# Patient Record
Sex: MALE | Race: ASIAN | NOT HISPANIC OR LATINO | URBAN - METROPOLITAN AREA
[De-identification: names, ages, dates, MRNs, and addresses within clinical notes are randomized per-mention and may not be internally consistent; named-entity substitution may affect disease eponyms.]

---

## 2019-09-05 ENCOUNTER — EMERGENCY (EMERGENCY)
Facility: HOSPITAL | Age: 49
LOS: 1 days | Discharge: HOME | End: 2019-09-05
Attending: STUDENT IN AN ORGANIZED HEALTH CARE EDUCATION/TRAINING PROGRAM | Admitting: STUDENT IN AN ORGANIZED HEALTH CARE EDUCATION/TRAINING PROGRAM
Payer: COMMERCIAL

## 2019-09-05 VITALS
TEMPERATURE: 98 F | DIASTOLIC BLOOD PRESSURE: 101 MMHG | RESPIRATION RATE: 16 BRPM | OXYGEN SATURATION: 98 % | SYSTOLIC BLOOD PRESSURE: 143 MMHG | HEART RATE: 71 BPM

## 2019-09-05 VITALS
HEART RATE: 68 BPM | OXYGEN SATURATION: 100 % | DIASTOLIC BLOOD PRESSURE: 78 MMHG | RESPIRATION RATE: 15 BRPM | SYSTOLIC BLOOD PRESSURE: 135 MMHG | TEMPERATURE: 98 F

## 2019-09-05 PROCEDURE — 99284 EMERGENCY DEPT VISIT MOD MDM: CPT | Mod: 25

## 2019-09-05 PROCEDURE — 93010 ELECTROCARDIOGRAM REPORT: CPT

## 2019-09-05 PROCEDURE — 71046 X-RAY EXAM CHEST 2 VIEWS: CPT | Mod: 26

## 2019-09-05 RX ORDER — OXYCODONE HYDROCHLORIDE 5 MG/1
1 TABLET ORAL
Qty: 8 | Refills: 0
Start: 2019-09-05 | End: 2019-09-06

## 2019-09-05 RX ORDER — VALACYCLOVIR 500 MG/1
1 TABLET, FILM COATED ORAL
Qty: 21 | Refills: 0
Start: 2019-09-05 | End: 2019-09-11

## 2019-09-05 RX ORDER — VALACYCLOVIR 500 MG/1
1000 TABLET, FILM COATED ORAL ONCE
Refills: 0 | Status: COMPLETED | OUTPATIENT
Start: 2019-09-05 | End: 2019-09-05

## 2019-09-05 RX ORDER — KETOROLAC TROMETHAMINE 30 MG/ML
15 SYRINGE (ML) INJECTION ONCE
Refills: 0 | Status: DISCONTINUED | OUTPATIENT
Start: 2019-09-05 | End: 2019-09-05

## 2019-09-05 RX ORDER — ACETAMINOPHEN 500 MG
975 TABLET ORAL ONCE
Refills: 0 | Status: COMPLETED | OUTPATIENT
Start: 2019-09-05 | End: 2019-09-05

## 2019-09-05 RX ADMIN — Medication 15 MILLIGRAM(S): at 09:37

## 2019-09-05 RX ADMIN — VALACYCLOVIR 1000 MILLIGRAM(S): 500 TABLET, FILM COATED ORAL at 09:37

## 2019-09-05 RX ADMIN — Medication 975 MILLIGRAM(S): at 09:36

## 2019-09-05 NOTE — ED ADULT TRIAGE NOTE - CHIEF COMPLAINT QUOTE
pt c/o severe pain across upper ribs/abdomen and back. pt also noted w rash across L side of ribs and mid chest which he noticed yesterday. pt did not take pain meds PTA. PMH- HTN, GERD.

## 2019-09-05 NOTE — ED PROVIDER NOTE - ATTENDING CONTRIBUTION TO CARE
ZHANG JamesD: 48M hx HTN p/w 4-5 days of left sided chest/back pain, described as burning/electric, with associated rash. now notes over the last day the pain has changed and is sharp/throbbing, worse with movement, worse with breathing, better with rest. has not taken anything fo rpain. no n/v no diaphoresis, no recent travel, nonsmoker, no calf pain. pt appears uncomfortable on exam at times when pain comes on, with vesicular rash in dermatomal distribution to left chest. abd soft nt nd no calf pain, unabe to reproduce pain on exam with papation of chest. no bruising or ecchymosis. lungs ctab. exam c/w shingles, which would explain burning pain but unclear if this expalins sharp throbbing pain. given no radiation to shoulder do not suspect inflammation of diaphragm, abd bening  do not suspect infradiaphragmatic process. pt is PERC negative so unlikely PE. not c/w acs, but will check ekg to assess for any cardiac pathology. will obtain cxr to ensure no ptx or other obvious lung pathology. symptoms may be pleurisy or just inflammatory from shingles. will give pain control and reassess.

## 2019-09-05 NOTE — ED ADULT NURSE NOTE - NSIMPLEMENTINTERV_GEN_ALL_ED
Implemented All Universal Safety Interventions:  [place] to call system. Call bell, personal items and telephone within reach. Instruct patient to call for assistance. Room bathroom lighting operational. Non-slip footwear when patient is off stretcher. Physically safe environment: no spills, clutter or unnecessary equipment. Stretcher in lowest position, wheels locked, appropriate side rails in place.

## 2019-09-05 NOTE — ED PROVIDER NOTE - OBJECTIVE STATEMENT
49 y/o male hx HTN presents to ED c/o left sided chest/back pain x 4 days. Pt. states 4-5 days ago developed rash to left lateral chest wall - states initially mild pain to area but since thehn pain has become much worse and now spreading to anterior chest wall as well as back. Pt. states today the pain became worse and is now spreading/worsening. denies taking anything for symptoms. states pain with movement and also when taknig a deep breath. Denies fever chills nausea vomit weakness dizziness sob.

## 2019-09-05 NOTE — ED PROVIDER NOTE - PATIENT PORTAL LINK FT
You can access the FollowMyHealth Patient Portal offered by [facility] by registering at the following website: [email or web address]. By joining [employer]’s FollowMyHealth portal, you will also be able to view your health information using other applications (apps) compatible with our system.

## 2019-09-05 NOTE — ED PROVIDER NOTE - SKIN RASH DESCRIPTION
left lateral chest wall: + grouped vesicles with surroudning erythema at t5 area without active bleeding or drainage.

## 2020-12-23 NOTE — ED ADULT NURSE NOTE - NSSUSCREENINGQ3_ED_ALL_ED
Phase II:  1. Patient is identified using name and the date of birth. 2.  The patient is free from signs and symptoms of chemical, electrical, laser, radiation, positioning, or transfer/transport injury. 3.  The patient receives appropriate medication(s), safely administered during the Perioperative period. 4.  The patient has wound/tissue perfusion consistent with or improved from baseline levels established preoperatively. 5.  The patient is at or returning to normothermia at the conclusion of the immediate postoperative period. 6.  The patient's fluid, electrolyte, and acid base balances are consistent with or improved from baseline levels established preoperatively. 7.  The patient's pulmonary function is consistent with or improved from baseline levels established preoperatively. 8.  The patient's cardiovascular status is consistent with or improved from baseline levels established preoperatively. 9.  The patient/caregiver demonstrates knowledge of nutritional management related to the operative or other invasive procedure. 10. The patient/caregiver demonstrates knowledge of medication, pain, and wound management. 11. The patient participates in the rehabilitation process as applicable. 12.  The patient/caregiver participates in decisions affection his or her Perioperative plan of care. 13.  The patient's care is consistent with the individualized Perioperative plan of care. 14.  The patient's right to privacy is maintained. 15. The patient is the recipient of competent and ethical care within legal standards of practice. 16.  The patient's value system, lifestyle, ethnicity, and culture are considered, respected, and incorporated in the Perioperative plan of care and understands special services available. 17.  The patient demonstrates and/or reports adequate pain control throughout the the Perioperative period. 18.   The patient's neurological status is consistent with or improved from No

## 2020-12-30 NOTE — ED ADULT NURSE NOTE - NSHOSCREENINGQ1_ED_ALL_ED
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 Recommended contacting his PCP to have a referral placed. She was concerned patient might have sleep apnea. He needs to complete sleep study within 6 months for work purposes.    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    Ok to place referral to sleep medicine?   No

## 2021-03-20 PROBLEM — I10 ESSENTIAL (PRIMARY) HYPERTENSION: Chronic | Status: ACTIVE | Noted: 2019-09-05

## 2021-04-09 ENCOUNTER — APPOINTMENT (OUTPATIENT)
Dept: DISASTER EMERGENCY | Facility: OTHER | Age: 51
End: 2021-04-09
Payer: COMMERCIAL

## 2021-04-09 PROCEDURE — 0002A: CPT

## 2022-01-25 ENCOUNTER — TRANSCRIPTION ENCOUNTER (OUTPATIENT)
Age: 52
End: 2022-01-25

## 2022-02-13 ENCOUNTER — EMERGENCY (EMERGENCY)
Facility: HOSPITAL | Age: 52
LOS: 1 days | Discharge: HOME | End: 2022-02-13
Attending: EMERGENCY MEDICINE
Payer: COMMERCIAL

## 2022-02-13 VITALS
DIASTOLIC BLOOD PRESSURE: 94 MMHG | TEMPERATURE: 98 F | HEART RATE: 86 BPM | OXYGEN SATURATION: 95 % | RESPIRATION RATE: 18 BRPM | SYSTOLIC BLOOD PRESSURE: 158 MMHG | HEIGHT: 66 IN | WEIGHT: 171.08 LBS

## 2022-02-13 LAB
ALBUMIN SERPL ELPH-MCNC: 4.4 G/DL — SIGNIFICANT CHANGE UP (ref 3.3–5)
ALP SERPL-CCNC: 61 U/L — SIGNIFICANT CHANGE UP (ref 40–120)
ALT FLD-CCNC: 46 U/L — HIGH (ref 10–45)
ANION GAP SERPL CALC-SCNC: 12 MMOL/L — SIGNIFICANT CHANGE UP (ref 5–17)
AST SERPL-CCNC: 23 U/L — SIGNIFICANT CHANGE UP (ref 10–40)
BASOPHILS # BLD AUTO: 0.07 K/UL — SIGNIFICANT CHANGE UP (ref 0–0.2)
BASOPHILS NFR BLD AUTO: 0.9 % — SIGNIFICANT CHANGE UP (ref 0–2)
BILIRUB SERPL-MCNC: 0.3 MG/DL — SIGNIFICANT CHANGE UP (ref 0.2–1.2)
BUN SERPL-MCNC: 10 MG/DL — SIGNIFICANT CHANGE UP (ref 7–23)
CALCIUM SERPL-MCNC: 9.5 MG/DL — SIGNIFICANT CHANGE UP (ref 8.4–10.5)
CHLORIDE SERPL-SCNC: 102 MMOL/L — SIGNIFICANT CHANGE UP (ref 96–108)
CO2 SERPL-SCNC: 23 MMOL/L — SIGNIFICANT CHANGE UP (ref 22–31)
CREAT SERPL-MCNC: 0.79 MG/DL — SIGNIFICANT CHANGE UP (ref 0.5–1.3)
D DIMER BLD IA.RAPID-MCNC: <150 NG/ML DDU — SIGNIFICANT CHANGE UP
EOSINOPHIL # BLD AUTO: 0.18 K/UL — SIGNIFICANT CHANGE UP (ref 0–0.5)
EOSINOPHIL NFR BLD AUTO: 2.3 % — SIGNIFICANT CHANGE UP (ref 0–6)
FLUAV AG NPH QL: SIGNIFICANT CHANGE UP
FLUBV AG NPH QL: SIGNIFICANT CHANGE UP
GLUCOSE SERPL-MCNC: 121 MG/DL — HIGH (ref 70–99)
HCT VFR BLD CALC: 41.5 % — SIGNIFICANT CHANGE UP (ref 39–50)
HGB BLD-MCNC: 14 G/DL — SIGNIFICANT CHANGE UP (ref 13–17)
IMM GRANULOCYTES NFR BLD AUTO: 0.1 % — SIGNIFICANT CHANGE UP (ref 0–1.5)
LYMPHOCYTES # BLD AUTO: 1.88 K/UL — SIGNIFICANT CHANGE UP (ref 1–3.3)
LYMPHOCYTES # BLD AUTO: 24.2 % — SIGNIFICANT CHANGE UP (ref 13–44)
MCHC RBC-ENTMCNC: 30.3 PG — SIGNIFICANT CHANGE UP (ref 27–34)
MCHC RBC-ENTMCNC: 33.7 GM/DL — SIGNIFICANT CHANGE UP (ref 32–36)
MCV RBC AUTO: 89.8 FL — SIGNIFICANT CHANGE UP (ref 80–100)
MONOCYTES # BLD AUTO: 0.65 K/UL — SIGNIFICANT CHANGE UP (ref 0–0.9)
MONOCYTES NFR BLD AUTO: 8.4 % — SIGNIFICANT CHANGE UP (ref 2–14)
NEUTROPHILS # BLD AUTO: 4.97 K/UL — SIGNIFICANT CHANGE UP (ref 1.8–7.4)
NEUTROPHILS NFR BLD AUTO: 64.1 % — SIGNIFICANT CHANGE UP (ref 43–77)
NRBC # BLD: 0 /100 WBCS — SIGNIFICANT CHANGE UP (ref 0–0)
NT-PROBNP SERPL-SCNC: 15 PG/ML — SIGNIFICANT CHANGE UP (ref 0–300)
PLATELET # BLD AUTO: 281 K/UL — SIGNIFICANT CHANGE UP (ref 150–400)
POTASSIUM SERPL-MCNC: 3.9 MMOL/L — SIGNIFICANT CHANGE UP (ref 3.5–5.3)
POTASSIUM SERPL-SCNC: 3.9 MMOL/L — SIGNIFICANT CHANGE UP (ref 3.5–5.3)
PROT SERPL-MCNC: 7.2 G/DL — SIGNIFICANT CHANGE UP (ref 6–8.3)
RBC # BLD: 4.62 M/UL — SIGNIFICANT CHANGE UP (ref 4.2–5.8)
RBC # FLD: 12 % — SIGNIFICANT CHANGE UP (ref 10.3–14.5)
RSV RNA NPH QL NAA+NON-PROBE: SIGNIFICANT CHANGE UP
SARS-COV-2 RNA SPEC QL NAA+PROBE: SIGNIFICANT CHANGE UP
SODIUM SERPL-SCNC: 137 MMOL/L — SIGNIFICANT CHANGE UP (ref 135–145)
TROPONIN T, HIGH SENSITIVITY RESULT: <6 NG/L — SIGNIFICANT CHANGE UP (ref 0–51)
WBC # BLD: 7.76 K/UL — SIGNIFICANT CHANGE UP (ref 3.8–10.5)
WBC # FLD AUTO: 7.76 K/UL — SIGNIFICANT CHANGE UP (ref 3.8–10.5)

## 2022-02-13 PROCEDURE — 99220: CPT

## 2022-02-13 PROCEDURE — 93010 ELECTROCARDIOGRAM REPORT: CPT

## 2022-02-13 PROCEDURE — 71045 X-RAY EXAM CHEST 1 VIEW: CPT | Mod: 26

## 2022-02-13 RX ORDER — ASPIRIN/CALCIUM CARB/MAGNESIUM 324 MG
162 TABLET ORAL ONCE
Refills: 0 | Status: COMPLETED | OUTPATIENT
Start: 2022-02-13 | End: 2022-02-13

## 2022-02-13 RX ORDER — ACETAMINOPHEN 500 MG
650 TABLET ORAL ONCE
Refills: 0 | Status: DISCONTINUED | OUTPATIENT
Start: 2022-02-13 | End: 2022-02-17

## 2022-02-13 RX ADMIN — Medication 162 MILLIGRAM(S): at 18:13

## 2022-02-13 NOTE — ED CDU PROVIDER DISPOSITION NOTE - CARE PROVIDER_API CALL
Jose Juan Power (DO)  Cardiology; Internal Medicine  [address] [place] [address], [address]  [place] [address]  Phone: [phone]  Fax: [phone]  Follow Up Time: 1-3 Days

## 2022-02-13 NOTE — ED ADULT NURSE NOTE - OBJECTIVE STATEMENT
51M aaox4 ambulatory p/w c/o worsening dyspnea. Patient reports chest pain and dyspnea for more than a week now, saw his PMD and was placed on Hydrochlorothiazide and Irbesatan and was instructed to ff-up with cardiologist, Quit smoking 2 years ago, smokes cigarettes for 30 years, recently stopped drinking alcohol 3 weeks ago, h/o HTn, anxiety, and HLD. Patient denies any chills or fever, nausea, vomiting or abd pain. VS WDL.

## 2022-02-13 NOTE — ED CDU PROVIDER DISPOSITION NOTE - ATTENDING CONTRIBUTION TO CARE
Attending MD Edgar:   I personally have seen and examined this patient.  Physician assistant note reviewed and agree on plan of care and except where noted.  See below for details.     Seen in [facility] CDU    51M with PMH/PSH including HTN, GERD, migraines, tinnitus sent to the CDU after presenting to the ED with L sided chest pain with associated LUE pain and shortness of breath.  Reports symptoms resolved.  Denies chest pain, shortness of breath or palpitations.  Denies abdominal pain, nausea, vomiting, diarrhea, bloody or black stools, urinary complaints, fevers, chills.  Denies numbness, weakness or tingling in extremities. Denies visual changes.  A ten (10) point review of systems was negative other than as stated in the HPI or elsewhere in the chart.     Exam:   General: NAD  HENT: head NCAT, airway patent   Eyes: PERRL  Lungs: lungs CTAB with good inspiratory effort, no wheezing, no rhonchi, no rales  Cardiac: +S1S2, no m/r/g  GI: abdomen soft with +BS, NT, ND  : no CVAT  MSK: FROM at neck, no tenderness to midline palpation, no stepoffs along length of spine, no calf tenderness, swelling, erythema or warmth  Neuro: moving all extremities spontaneously, sensory grossly intact, no gross neuro deficits  Psych: normal mood and affect     A/P: 51M with chest pain, now resolved.  CT coronary, labs, and repeat CXR nonactionable, reviewed with patient.  Patient re-evaluated and feeling improved.  No acute issues at  this time.  Lab and radiology tests reviewed with patient.  Patient stable for discharge.  Follow up instructions given, importance of follow up emphasized, return to ED parameters reviewed and patient verbalized understanding.  All questions answered, all concerns addressed.

## 2022-02-13 NOTE — ED CDU PROVIDER INITIAL DAY NOTE - NSICDXPASTMEDICALHX_GEN_ALL_CORE_FT
PAST MEDICAL HISTORY:  COVID-19 vaccine series completed W booster    GERD (gastroesophageal reflux disease)     HTN (hypertension)     Migraines     Tinnitus

## 2022-02-13 NOTE — ED PROVIDER NOTE - ATTENDING CONTRIBUTION TO CARE
Private Physician Amos Chana Aldridge [phone] On Staff  51y male pmh ETOH/dc 3w ago, Ex-smoker dc two years ago. PMH Gerd, Depression, MIgraines HTN, Pt comes to ed c/o SOB, fatigue, out of energy, onset past week, Associated w chest pain, left side, rad to left arm, Seen by pmd two days ago referred for eval. Pain improves w lying worse w exertion. Off/on lasts upto 20 sec. No hx mi, travel, Private Physician Chana Trinidad [phone] On Staff  51y male pmh ETOH/dc 3w ago, Ex-smoker dc two years ago. PMH Gerd, Depression, MIgraines HTN, Pt comes to ed c/o SOB, fatigue, out of energy, onset past week, Associated w chest pain, left side, rad to left arm, Seen by pmd two days ago referred for eval. Pain improves w lying worse w exertion. Off/on lasts upto 20 sec. No hx mi, travel,cancer,leg pain, nvdc, PE WDWN male looking mildly fatigued. heent normocephalic atraumatic neck supple chest clear anterior & posterior cv no rubs, gallops or murmurs abd soft +bs no mass guarding neruo no focal defects. msk no sweelling edema lower extr  Jose Juan Barba MD, Facep

## 2022-02-13 NOTE — ED PROVIDER NOTE - OBJECTIVE STATEMENT
Pt is a 51yoM w/Hx of HTN, GERD, migraines, tinnitus p/w chest pain. For the last week pt has been experiencing intermittent 20-second episodes of CP described as sharp L-sided CP w/radiation down L arm, associated w/SOB, nausea, fatigue/generalized weakness, partially relieved by lying down, worsened by exertion. Seen by PMD 2 days ago who recommended pt come to ED for further evaluation. He denies HA, visual changes, hearing changes, cough/sore throat/congestion, pain with breathing, palpitations, back pain, abd pain, n/v/d/c, dysuria/freq/urg, hematuria/hematochezia/melena, numbness/tingling, focal weakness, swelling, dizziness/lightheadedness, fevers/chills, no sick contacts, no recent travel.

## 2022-02-13 NOTE — ED PROVIDER NOTE - CLINICAL SUMMARY MEDICAL DECISION MAKING FREE TEXT BOX
Pt is a 51yoM w/Hx of HTN, GERD, migraines, tinnitus p/w intermittent episodes of CP associated w/SOB, fatigue, generalized weakness. VSS, phys exam unremarkable. DDx ACS, order labs, CXR, ecg, and reassess. - Ahsley Clarke, PGY-1

## 2022-02-13 NOTE — ED PROVIDER NOTE - NSICDXPASTMEDICALHX_GEN_ALL_CORE_FT
PAST MEDICAL HISTORY:  COVID-19 vaccine series completed W booster    GERD (gastroesophageal reflux disease)     HTN (hypertension)     Migraines      PAST MEDICAL HISTORY:  COVID-19 vaccine series completed W booster    GERD (gastroesophageal reflux disease)     HTN (hypertension)     Migraines     Tinnitus

## 2022-02-13 NOTE — ED CDU PROVIDER INITIAL DAY NOTE - NS ED ROS FT
GENERAL: No fever or chills, EYES: no change in vision, HEENT: no trouble swallowing or speaking, CARDIAC: +chest pain, PULMONARY: +SOB, no cough, GI: no abdominal pain, no nausea, no vomiting, no diarrhea or constipation, : No changes in urination, SKIN: no rashes, NEURO: no headache,  MSK: No joint pain     All other ROS negative unless otherwise specified in HPI.

## 2022-02-13 NOTE — ED CDU PROVIDER INITIAL DAY NOTE - DETAILS
CHEST PAIN  -TELE  -[facility]  -[facility] EVAL  -CT CORONARY   -CASE D/W ATTENDING

## 2022-02-13 NOTE — ED CDU PROVIDER DISPOSITION NOTE - CLINICAL COURSE
Pt is a 51yoM w/Hx of HTN, GERD, migraines, tinnitus p/w chest pain. For the last week pt has been experiencing intermittent 20-second episodes of CP described as sharp L-sided CP w/radiation down L arm, associated w/SOB, nausea, fatigue/generalized weakness, partially relieved by lying down, worsened by exertion. Seen by PMD 2 days ago who recommended pt come to ED for further evaluation. He denies HA, visual changes, hearing changes, cough/sore throat/congestion, pain with breathing, palpitations, back pain, abd pain, n/v/d/c, dysuria/freq/urg, hematuria/hematochezia/melena, numbness/tingling, focal weakness, swelling, dizziness/lightheadedness, fevers/chills, no sick contacts, no recent travel.  In ED, patient had ekg no signs of acute ischemia, troponin <6, chest x ray no signs of acute pathology. Pt sent to CDU for frequent reeval, vitals q 4hrs, telemetry monitoring and CT coronaries. Pt is a 51yoM w/Hx of HTN, GERD, migraines, tinnitus p/w chest pain. For the last week pt has been experiencing intermittent 20-second episodes of CP described as sharp L-sided CP w/radiation down L arm, associated w/SOB, nausea, fatigue/generalized weakness, partially relieved by lying down, worsened by exertion. Seen by PMD 2 days ago who recommended pt come to ED for further evaluation. He denies HA, visual changes, hearing changes, cough/sore throat/congestion, pain with breathing, palpitations, back pain, abd pain, n/v/d/c, dysuria/freq/urg, hematuria/hematochezia/melena, numbness/tingling, focal weakness, swelling, dizziness/lightheadedness, fevers/chills, no sick contacts, no recent travel.  In ED, patient had ekg no signs of acute ischemia, troponin <6, chest x ray no signs of acute pathology. Pt sent to CDU for frequent reeval, vitals q 4hrs, telemetry monitoring and CT coronaries.  In CDU, no events on tele. TTE WNL. CTC nonactionable. Patient seen and cleared by cardiology, to continue home meds and f/u in office. Pt's repeat CXR WNL. Stable for d/c, d/w Dr. Edgar. All results d/w patient including CT findings of mediastinal lymph nodes due to a prior granulomatous infection and 3 mm right lower lobe calcified granuloma. Plan to also give pulm f/u.

## 2022-02-13 NOTE — ED CDU PROVIDER INITIAL DAY NOTE - ATTENDING CONTRIBUTION TO CARE
I have personally performed a face to face diagnostic evaluation on this patient.  I have reviewed the ACP note and agree with the history, exam, and plan of care, except as noted.  History and Exam by me shows  see ED provider note  Jose Juan Barba MD, Facep

## 2022-02-13 NOTE — ED CDU PROVIDER DISPOSITION NOTE - NSFOLLOWUPINSTRUCTIONS_ED_ALL_ED_FT
1. Follow up with your PMD within 48-72 hours.   You may schedule appointment with Cardiology clinic this week by calling [phone]  2. Show copies of your reports given to you. Recommend Aspirin 81mg over the counter daily until further evaluation.  Take all of your other medications as previously prescribed.   3. Worsening or continued chest pain, shortness of breath, weakness, return to ED. Continue your current medication regimen.    Please follow up with Cardiologist, Dr. Power, in office this week.     Please also follow-up with Pulmonologist upon discharge. Information provided.   Be sure to follow-up CT chest findings of: mediastinal lymph nodes due to a prior granulomatous infection and 3 mm right lower lobe calcified granuloma.     Follow up with your Primary Care Provider upon discharge.     Bring a copy of your test results (attached along with your discharge paperwork) with you to your appointment for further discussion, evaluation, and comparison with your prior results.    Please return to the Emergency Department immediately for any new, worsening, or concerning symptoms.    Jose Juan Power (DO)  Cardiology; Internal Medicine  [address] [facility], [address]  [place] [address]  Phone: [phone]    [facility] Pulmonolgy and Sleep Medicine  Pulmonology  [address] [place] [address], [address]  [place] [address]  Phone: [phone]

## 2022-02-13 NOTE — ED CDU PROVIDER INITIAL DAY NOTE - PHYSICAL EXAMINATION
Gen: AAOx3, non-toxic  Head: NCAT  HEENT: EOMI, PERRLA, +oral mucosa dry, normal conjunctiva  Lung: CTAB, no respiratory distress, no wheezes/rhonchi/rales B/L, speaking in full sentences  CV: RRR, no murmurs, rubs or gallops  Abd: soft, NTND, no guarding, no CVA tenderness  MSK: no visible deformities  Neuro: No focal sensory or motor deficits  Skin: Warm, well perfused, no rash

## 2022-02-13 NOTE — ED CDU PROVIDER DISPOSITION NOTE - NSFOLLOWUPCLINICS_GEN_ALL_ED_FT
[facility] Pulmonolgy and Sleep Medicine  Pulmonology  [address] [place] [address], [facility] [address]  [place] [address]  Phone: [phone]  Fax:   Follow Up Time: 1-3 Days

## 2022-02-13 NOTE — ED PROVIDER NOTE - PHYSICAL EXAMINATION
Gen: AAOx3, non-toxic, WDWN male lying in bed, tired-appearing  Head: NCAT  HEENT: EOMI, PERRLA, +oral mucosa dry, normal conjunctiva  Lung: CTAB, no respiratory distress, no wheezes/rhonchi/rales B/L, speaking in full sentences  CV: RRR, no murmurs, rubs or gallops  Abd: soft, NTND, no guarding, no CVA tenderness  MSK: no visible deformities  Neuro: No focal sensory or motor deficits  Skin: Warm, well perfused, no rash  Psych: normal affect.   ~Ashley Clarke M.D. Resident

## 2022-02-13 NOTE — ED CDU PROVIDER INITIAL DAY NOTE - OBJECTIVE STATEMENT
Pt is a 51yoM w/Hx of HTN, GERD, migraines, tinnitus p/w chest pain. For the last week pt has been experiencing intermittent 20-second episodes of CP described as sharp L-sided CP w/radiation down L arm, associated w/SOB, nausea, fatigue/generalized weakness, partially relieved by lying down, worsened by exertion. Seen by PMD 2 days ago who recommended pt come to ED for further evaluation. He denies HA, visual changes, hearing changes, cough/sore throat/congestion, pain with breathing, palpitations, back pain, abd pain, n/v/d/c, dysuria/freq/urg, hematuria/hematochezia/melena, numbness/tingling, focal weakness, swelling, dizziness/lightheadedness, fevers/chills, no sick contacts, no recent travel.  In ED, patient had ekg no signs of acute ischemia, troponin <6, chest x ray no signs of acute pathology. Pt sent to CDU for frequent reeval, vitals q 4hrs, telemetry monitoring and CT coronaries.

## 2022-02-13 NOTE — ED PROVIDER NOTE - NS ED ROS FT
GENERAL: No fever or chills, EYES: no change in vision, HEENT: no trouble swallowing or speaking, CARDIAC: +chest pain, PULMONARY: +SOB, no cough, GI: no abdominal pain, no nausea, no vomiting, no diarrhea or constipation, : No changes in urination, SKIN: no rashes, NEURO: no headache,  MSK: No joint pain     All other ROS negative unless otherwise specified in HPI.     ~Ashley Clarke M.D. Resident

## 2022-02-13 NOTE — ED CDU PROVIDER DISPOSITION NOTE - PATIENT PORTAL LINK FT
You can access the FollowMyHealth Patient Portal offered by [facility] by registering at the following website: [email or web address]. By joining [employer]’s FollowMyHealth portal, you will also be able to view your health information using other applications (apps) compatible with our system.

## 2022-02-14 VITALS
SYSTOLIC BLOOD PRESSURE: 122 MMHG | HEART RATE: 58 BPM | DIASTOLIC BLOOD PRESSURE: 83 MMHG | RESPIRATION RATE: 18 BRPM | OXYGEN SATURATION: 100 % | TEMPERATURE: 98 F

## 2022-02-14 DIAGNOSIS — K21.9 GASTRO-ESOPHAGEAL REFLUX DISEASE WITHOUT ESOPHAGITIS: ICD-10-CM

## 2022-02-14 DIAGNOSIS — I10 ESSENTIAL (PRIMARY) HYPERTENSION: ICD-10-CM

## 2022-02-14 DIAGNOSIS — R07.9 CHEST PAIN, UNSPECIFIED: ICD-10-CM

## 2022-02-14 LAB
A1C WITH ESTIMATED AVERAGE GLUCOSE RESULT: 5.5 % — SIGNIFICANT CHANGE UP (ref 4–5.6)
CHOLEST SERPL-MCNC: 180 MG/DL — SIGNIFICANT CHANGE UP
ESTIMATED AVERAGE GLUCOSE: 111 MG/DL — SIGNIFICANT CHANGE UP (ref 68–114)
HDLC SERPL-MCNC: 55 MG/DL — SIGNIFICANT CHANGE UP
LIPID PNL WITH DIRECT LDL SERPL: 111 MG/DL — HIGH
NON HDL CHOLESTEROL: 125 MG/DL — SIGNIFICANT CHANGE UP
TRIGL SERPL-MCNC: 70 MG/DL — SIGNIFICANT CHANGE UP
TROPONIN T, HIGH SENSITIVITY RESULT: <6 NG/L — SIGNIFICANT CHANGE UP (ref 0–51)

## 2022-02-14 PROCEDURE — 93005 ELECTROCARDIOGRAM TRACING: CPT | Mod: XU

## 2022-02-14 PROCEDURE — 99217: CPT

## 2022-02-14 PROCEDURE — 87637 SARSCOV2&INF A&B&RSV AMP PRB: CPT

## 2022-02-14 PROCEDURE — 85379 FIBRIN DEGRADATION QUANT: CPT

## 2022-02-14 PROCEDURE — 99285 EMERGENCY DEPT VISIT HI MDM: CPT | Mod: 25

## 2022-02-14 PROCEDURE — 71046 X-RAY EXAM CHEST 2 VIEWS: CPT

## 2022-02-14 PROCEDURE — 83880 ASSAY OF NATRIURETIC PEPTIDE: CPT

## 2022-02-14 PROCEDURE — 71046 X-RAY EXAM CHEST 2 VIEWS: CPT | Mod: 26

## 2022-02-14 PROCEDURE — 93306 TTE W/DOPPLER COMPLETE: CPT

## 2022-02-14 PROCEDURE — 80053 COMPREHEN METABOLIC PANEL: CPT

## 2022-02-14 PROCEDURE — 75574 CT ANGIO HRT W/3D IMAGE: CPT | Mod: 26,MA

## 2022-02-14 PROCEDURE — 93306 TTE W/DOPPLER COMPLETE: CPT | Mod: 26

## 2022-02-14 PROCEDURE — 80061 LIPID PANEL: CPT

## 2022-02-14 PROCEDURE — 75574 CT ANGIO HRT W/3D IMAGE: CPT | Mod: MA

## 2022-02-14 PROCEDURE — 84484 ASSAY OF TROPONIN QUANT: CPT

## 2022-02-14 PROCEDURE — 85025 COMPLETE CBC W/AUTO DIFF WBC: CPT

## 2022-02-14 PROCEDURE — 36415 COLL VENOUS BLD VENIPUNCTURE: CPT

## 2022-02-14 PROCEDURE — 71045 X-RAY EXAM CHEST 1 VIEW: CPT

## 2022-02-14 PROCEDURE — 83036 HEMOGLOBIN GLYCOSYLATED A1C: CPT

## 2022-02-14 PROCEDURE — G0378: CPT

## 2022-02-14 NOTE — ED CDU PROVIDER SUBSEQUENT DAY NOTE - PROGRESS NOTE DETAILS
Patient seen and evaluated at bedside with Dr. Cunningham. Reports intermittent L-sided CP/tightness, no current pain. Reports episodes of SOB yesterday and the day prior that had never happened before. Denies cough, fever/chills, LE pain/swelling, abdominal pain. VSS. Pending CTC today. Plan to also add Echo. Echo lab called to expedite. No events on tele. Called by radiology regarding concern for air under R hemidiaphragm on patient's previous CXR. Pt remains with NO abdominal pain. Tolerating PO and feeling well. D/w Dr. Edgar, will repeat upright CXR. CTC results reviewed. Unattached cardiology, Dr. Power, consulted. Reports NP will come see patient now. Patient seen and cleared by cardiology, to continue home meds and f/u in office. Pt's repeat CXR WNL. Stable for d/c, d/w Dr. Edgar. Called by radiology regarding concern for air under R hemidiaphragm on patient's previous CXR. Pt remains with NO abdominal pain. No current SOB. Tolerating PO and feeling well. D/w Dr. Edgar, will repeat upright CXR. Patient seen and cleared by cardiology, to continue home meds and f/u in office. Pt's repeat CXR WNL. Stable for d/c, d/w Dr. Edgar. All results d/w patient including CT findings of mediastinal lymph nodes due to a prior granulomatous infection and 3 mm right lower lobe calcified granuloma. Plan to also give pulm f/u.

## 2022-02-14 NOTE — ED CDU PROVIDER SUBSEQUENT DAY NOTE - ATTENDING CONTRIBUTION TO CARE
Agree with above except noted:     acute onset of shortness of breath and dynpnea, w.o fever or cough. mild chest pain w. neg trop and dimer and bnp at initial eval. vital signs wnl, nontoxic appearing, NAD, pending ctc at CDU and will add on echo for HF eval. no LE edema or swelling. no sign of infectious etiology at this time. dispo pending labs/imaging/further workups.

## 2022-02-14 NOTE — ED ADULT NURSE REASSESSMENT NOTE - NS ED NURSE REASSESS COMMENT FT1
07.00 Am Received the Pt from  NORA Mistry Pt is Observed for Chest pain for CTC. Received the Pt A&OX 4 obeys commands Hortencia N/V/D fever chills cp SOB   Comfort care & safety measures continued  IV site looks clean & dry no signs of infiltration noted pt denies  pain IV site .  Pt is advised to call for help  call bell with in the reach pt verbalized the understanding . pending CDU  MD bowen . GCS 15/15 A&OX 4 PERRLA  size 3 Strong upper & lower extremities steady gait   No facial droop  No Hand Leg drop denies numbness tingling Continue to monitor. Pt is NSR on the monitor
15.30 Pt is evaluated by CDU MD Tala Campos . pt is feeling better.  Pt is discharged . Ml out  TERRELL Lopez explained the follow up care & gave the discharge summary  . Pt has stable vitals steady gait A&OX 4 at the time of Discharge
Report received from NORA Carbajal. Pt AAOx4, NAD, resp nonlabored, skin warm/dry, resting comfortably in bed with call bell at bedside. Pt denies headache, dizziness, chest pain, palpitations, SOB, abd pain, n/v/d, urinary symptoms, fevers, chills, weakness at this time. Pt awaiting CDU PA assessment. Safety maintained.
Pt received from NORA Johnson. Pt oriented to CDU & plan of care was discussed. Pt A&O x 4. Pt in CDU for tele, CTC in am. Pt c/o 3/10 chest tightness, declines anything at this time. Pt denies any SOB, dizziness or palpitations. Pt on a cardiac monitor in sinus elpidio, HR in 50's. V/S stable, pt afebrile,  IV in place, patent and free of signs of infiltration. Pt resting in bed. Safety & comfort measures maintained. Call bell in reach. Will continue to monitor.

## 2022-02-14 NOTE — ED CDU PROVIDER SUBSEQUENT DAY NOTE - HISTORY
CDU PROGRESS NOTE TERRELL SALCIDO: Pt resting comfortably, NAD, VSS. No events on telemetry. Will continue to monitor, CTC in am.

## 2022-02-14 NOTE — CONSULT NOTE ADULT - PROBLEM SELECTOR RECOMMENDATION 9
intermittent chest tightening with radiation to left arm  reviewed CT Angio & TTE - normal  EKG normal - no acute ischemia  NSR on tele  troponins negative  outpatient follow up with Dr. Power - discussed with patient

## 2022-02-14 NOTE — ED CDU PROVIDER SUBSEQUENT DAY NOTE - PHYSICAL EXAMINATION
Gen: AAOx3, non-toxic  Head: NCAT  HEENT: EOMI, PERRLA, normal conjunctiva  Lung: CTAB, no respiratory distress, no wheezes/rhonchi/rales B/L, speaking in full sentences  CV: RRR, no murmurs, rubs or gallops  Abd: soft, NTND, no guarding, no CVA tenderness  MSK: no visible deformities  Neuro: No focal sensory or motor deficits  Skin: Warm, well perfused, no rash

## 2022-02-14 NOTE — CONSULT NOTE ADULT - SUBJECTIVE AND OBJECTIVE BOX
CHIEF COMPLAINT:  Chest pain    HISTORY OF PRESENT ILLNESS:  50yo M w/ Hx of HTN, GERD, migraines, tinnitus p/w chest pain. For the last week pt has been experiencing intermittent 20-second episodes of CP described as sharp L-sided CP w/radiation down L arm, associated w/SOB, nausea, fatigue/generalized weakness, partially relieved by lying down, worsened by exertion. Seen by PMD 2 days ago who recommended pt come to ED for further evaluation. He denies HA, visual changes, hearing changes, cough/sore throat/congestion, pain with breathing, palpitations, back pain, abd pain, n/v/d/c, dysuria/freq/urg, hematuria/hematochezia/melena, numbness/tingling, focal weakness, swelling, dizziness/lightheadedness, fevers/chills, no sick contacts, no recent travel.    In ED, patient had EKG with no signs of acute ischemia, troponin <6, chest x ray no signs of acute pathology.  Pt sent to CDU for frequent re-eval, vitals q 4hrs, telemetry monitoring and CT coronaries.    PAST MEDICAL & SURGICAL HISTORY:  HTN (hypertension)    GERD (gastroesophageal reflux disease)    Migraines    COVID-19 vaccine series completed  W booster    Tinnitus    No significant past surgical history    MEDICATIONS:    acetaminophen     Tablet .. 650 milliGRAM(s) Oral once    FAMILY HISTORY:  FH: heart disease (Grandparent)    SOCIAL HISTORY:    [ ] Non-smoker  [ ] Smoker  [ ] Alcohol    Allergies    No Known Allergies    Intolerances    REVIEW OF SYSTEMS:  CONSTITUTIONAL: No fever, weight loss, or fatigue  EYES: No eye pain, visual disturbances, or discharge  ENMT:  No difficulty hearing, tinnitus, vertigo; No sinus or throat pain  NECK: No pain or stiffness  RESPIRATORY: No cough, wheezing, chills or hemoptysis; + Shortness of Breath  CARDIOVASCULAR: + chest pain, palpitations, passing out, dizziness, or leg swelling  GASTROINTESTINAL: No abdominal or epigastric pain. No nausea, vomiting, or hematemesis; No diarrhea or constipation. No melena or hematochezia.  GENITOURINARY: No dysuria, frequency, hematuria, or incontinence  NEUROLOGICAL: No headaches, memory loss, loss of strength, numbness, or tremors  SKIN: No itching, burning, rashes, or lesions   LYMPH Nodes: No enlarged glands  ENDOCRINE: No heat or cold intolerance; No hair loss  MUSCULOSKELETAL: No joint pain or swelling; No muscle, back, or extremity pain  PSYCHIATRIC: No depression, anxiety, mood swings, or difficulty sleeping  HEME/LYMPH: No easy bruising, or bleeding gums  ALLERY AND IMMUNOLOGIC: No hives or eczema	    [ ] All others negative	  [ ] Unable to obtain    PHYSICAL EXAM:  T(C): 37.1 (02-14-22 @ 07:43), Max: 37.1 (02-14-22 @ 07:43)  HR: 87 (02-14-22 @ 07:43) (59 - 87)  BP: 135/95 (02-14-22 @ 07:43) (132/- - 158/94)  RR: 18 (02-14-22 @ 07:43) (18 - 21)  SpO2: 100% (02-14-22 @ 07:43) (95% - 100%)  Wt(kg): --  I&O's Summary    Appearance: NAD	  HEENT: Normal oral mucosa, PERRL, EOMI	  Lymphatic: No lymphadenopathy  Cardiovascular: Normal S1 S2, No JVD, No murmurs, No edema  Respiratory: Lungs clear to auscultation	  Psychiatry: A & O x 3, Mood & affect appropriate  Gastrointestinal:  Soft, Non-tender, + BS	  Skin: No rashes, No ecchymoses, No cyanosis	  Neurologic: Non-focal  Extremities: Normal range of motion, No clubbing, cyanosis or edema  Vascular: Peripheral pulses palpable 2+ bilaterally    TELEMETRY: SR	    ECG:  SR with no acute ischemia	  RADIOLOGY:  < from: CT Angio Heart and Coronaries w/ IV Cont (02.14.22 @ 10:58) >  ACC: [id number] EXAM:  CT ANGIO HEART CORONARY IC                          PROCEDURE DATE:  02/14/2022      INTERPRETATION:  Indication:  Coronary artery disease    History:  Mr. Araujo is a 51-year-old man with hypertension, dyslipidemia,   and a history of tobacco use who reports chest pain and dyspnea.    Scanner:  [facility] [employer]    Acquisitions:  1.  Non-contrast prospectively-gated 320-multidtector volumetric computed   tomography heart  2.  Contrast-enhanced prospectively-gated 320-multidetector volumetric   computed tomography heart    Pre-medications:  1.  Metoprolol 15 mg intravenous  2.  Nitroglycerin 0.8 mg sublingual    Contrast:  75 mL Omnipaque 350    Resting heart rate (contrast-enhanced acquisition):  51 beats per minute    Quality:  Good    Post-processing:  Three-dimensional volume-rendered and multiplanar   reconstructions generated with [employer] software.    FINDINGS:    Cardiovascular:    Coronary arteries:  Coronary artery calcium Agatston score:  Left main (LM) coronary artery:  0  Left anterior descending (LAD) coronary artery:  0  Left circumflex (LCX) coronary artery:  0  Right coronary artery (RCA):  0 (A very small focus of calcium did not   meet the Agatston score threshold for attenuation and/or size.)  Total:  0    Right-dominant coronary circulation.    The LM coronary artery is angiographically normal.    The LAD coronary artery has minimal (<30%) noncalcified plaque in the   proximal segment. A superficial myocardial bridge is noted in the mid   segment. The LAD wraps around the left ventricular apex. The two diagonal   branches are angiographically normal.    The LCX coronary artery is angiographically normal. A small, high first   obtuse marginal (OM) branch is present. Thesecond OM branch is   angiographically normal. The LCX terminates as a left posterolateral   (LPL) branch.    The RCA has minimal (<30%) noncalcified and calcified plaque in the   proximal segment.  The right posterior descending artery (PDA) is   angiographically normal.  A small right posterolateral (RPL) branch is   present.    Aorta:  No thoracic aortic dissection noted in the visualized thoracic aorta.    Minimal noncalcified plaque is present in the visualized thoracic aorta.    Pericardium:  There is no pericardial effusion.    Chambers:  The cardiac chambers are qualitatively normal in size.    No filling defect noted in the left atrial appendage.    There is a patent foramen ovale with evidence of left-to-right flow of   contrast agent.    Non-cardiac:  Calcified mediastinal and right hilar lymph nodes due to a prior   granulomatous infection. Evaluation of the imaged portions of the lungs   demonstrate 3 mm right lower lobe calcified granuloma. Degenerative   changes of the spine.    IMPRESSION:  1.  Nonobstructive epicardial coronary artery disease as reported above.  2.  Coronary artery calcium Agatston score = 0. A very small focus of   calcium in the RCA did not meet the Agatston score threshold for   attenuation/size.    --- End of Report ---    < end of copied text >  < from: Xray Chest 1 View AP/PA (02.13.22 @ 18:15) >  ACC: [id number] EXAM:  XR CHEST AP OR PA 1V                          PROCEDURE DATE:  02/13/2022      INTERPRETATION:  EXAMINATION: XR CHEST    CLINICAL INDICATION: Chest pain.    TECHNIQUE: Single frontal, portable view of the chest was obtained.    COMPARISON: Chest radiograph 9/5/2019.    FINDINGS:    The heart is normal in size.  The lungs are clear.  There is no pneumothorax or pleural effusion.  No acute bony abnormalities.    IMPRESSION:  Clear lungs.    --- End of Report ---    < end of copied text >  < from: Xray Chest 2 Views PA/Lat (09.05.19 @ 10:37) >    EXAM:  XR CHEST PA LAT 2V        PROCEDURE DATE:  Sep  5 2019         INTERPRETATION:  CLINICAL INFORMATION: Chest pain    TIME OF EXAMINATION: September 5, 2019 at 10:54 AM    EXAM: PA and Lateral Chest    FINDINGS:  Examination in frontal and lateral projection fails to show   evidence of any active pulmonary disease.  The heart is not enlarged and   there is no pleural effusion or pneumothorax.  There is no acute bone   pathology.        COMPARISON: September 19, 2007        IMPRESSION: Normal chest    < end of copied text >    OTHER: 	  	  LABS:	 	    CARDIAC MARKERS:  Troponin T, High Sensitivity Result: <6                        14.0   7.76  )-----------( 281      ( 13 Feb 2022 18:00 )             41.5     02-13    137  |  102  |  10  ----------------------------<  121<H>  3.9   |  23  |  0.79    Ca    9.5      13 Feb 2022 18:00    TPro  7.2  /  Alb  4.4  /  TBili  0.3  /  DBili  x   /  AST  23  /  ALT  46<H>  /  AlkPhos  61  02-13    proBNP: Serum Pro-Brain Natriuretic Peptide: 15 pg/mL (02-13 @ 18:00)  Lipid Profile: Cholesterol, Serum: 180 mg/dL   Triglycerides, Serum: 70 mg/dL   HDL Cholesterol, Serum: 55 mg/dL   Non HDL Cholesterol: 125    LDL Cholesterol Calculated: 111 mg/dL   HgA1c: A1C with Estimated Average Glucose Result: 5.5  TSH:

## 2022-02-16 PROBLEM — K21.9 GASTRO-ESOPHAGEAL REFLUX DISEASE WITHOUT ESOPHAGITIS: Chronic | Status: ACTIVE | Noted: 2022-02-13

## 2022-02-16 PROBLEM — G43.909 MIGRAINE, UNSPECIFIED, NOT INTRACTABLE, WITHOUT STATUS MIGRAINOSUS: Chronic | Status: ACTIVE | Noted: 2022-02-13

## 2022-02-16 PROBLEM — Z92.29 PERSONAL HISTORY OF OTHER DRUG THERAPY: Chronic | Status: ACTIVE | Noted: 2022-02-13

## 2022-02-16 PROBLEM — H93.19 TINNITUS, UNSPECIFIED EAR: Chronic | Status: ACTIVE | Noted: 2022-02-13

## 2022-02-25 ENCOUNTER — APPOINTMENT (OUTPATIENT)
Dept: PULMONOLOGY | Facility: CLINIC | Age: 52
End: 2022-02-25
Payer: COMMERCIAL

## 2022-02-25 VITALS
DIASTOLIC BLOOD PRESSURE: 80 MMHG | SYSTOLIC BLOOD PRESSURE: 118 MMHG | BODY MASS INDEX: 25.71 KG/M2 | OXYGEN SATURATION: 97 % | RESPIRATION RATE: 17 BRPM | HEART RATE: 82 BPM | TEMPERATURE: 97.5 F | HEIGHT: 66 IN | WEIGHT: 160 LBS

## 2022-02-25 DIAGNOSIS — Z87.891 PERSONAL HISTORY OF NICOTINE DEPENDENCE: ICD-10-CM

## 2022-02-25 PROCEDURE — 99203 OFFICE O/P NEW LOW 30 MIN: CPT

## 2022-02-25 NOTE — PHYSICAL EXAM
[No Acute Distress] : no acute distress [Normal Appearance] : normal appearance [No Resp Distress] : no resp distress [Clear to Auscultation Bilaterally] : clear to auscultation bilaterally [Normal Gait] : normal gait [No Edema] : no edema [No Focal Deficits] : no focal deficits [Oriented x3] : oriented x3

## 2022-02-25 NOTE — HISTORY OF PRESENT ILLNESS
[TextBox_4] : Pt here for follow up SOB following chest pain. Denies cough/ wheeze. Some need to clear throat frequently. Since is onset c/o tinnitus x 1 month causing increased depression/anxiety/dizziness, 13 lb weight loss.  Since ED visit- he is followed by PCP and treatment on antidepressants/ complimentary alternative medication/ acupuncture, anti-HTN medication\par \par CXR clear in ED\par Echo 2/14/2020 - normal\par \par CT heart/coronaries\par IMPRESSION:\par 1.  Nonobstructive epicardial coronary artery disease as reported above.\par 2.  Coronary artery calcium Agatston score = 0. 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

## 2022-02-25 NOTE — ASSESSMENT
[FreeTextEntry1] : SOB: Check PFT. Chest Xray in ED normal. Some component of anxiety. Ongoing f/u and adherence to anxiolytic therapy/ CAM/ acupunctures. Referral for CBT program/ eval. \par \par Lung ca. screening:Hx heeavy smoking: lung ca. screening completed. Hx of 1ppd smoking from 14 years old to 49 years (35 pack years)\par \par I, Giuliana Hargrove NP, am scribing for and in the presence of Dr. Lencho Lockwood, the following sections HISTORY OF PRESENT ILLNESS, PAST MEDICAL/FAMILY/SOCIAL HISTORY; REVIEW OF SYSTEMS; VITAL SIGNS; PHYSICAL EXAM; DISPOSITION.\par

## 2022-09-21 ENCOUNTER — APPOINTMENT (OUTPATIENT)
Dept: ORTHOPEDIC SURGERY | Facility: CLINIC | Age: 52
End: 2022-09-21

## 2022-09-21 VITALS
BODY MASS INDEX: 27.32 KG/M2 | SYSTOLIC BLOOD PRESSURE: 138 MMHG | WEIGHT: 170 LBS | DIASTOLIC BLOOD PRESSURE: 101 MMHG | HEIGHT: 66 IN | HEART RATE: 80 BPM

## 2022-09-21 PROCEDURE — 99203 OFFICE O/P NEW LOW 30 MIN: CPT

## 2022-09-21 NOTE — HISTORY OF PRESENT ILLNESS
[de-identified] : Mr. LEIGHA CARSON  is a 52 year old male who presents with a chronic history of low back and left > right pain.  He has also developed left abdominal pain since last November.  His back and leg pain is a big problem.  He can not stand and brush his teeth.  He has done physical therapy, acupuncture, and chiropractic care  with minimal relief.  Normal bowel and bladder control.   Denies any recent fevers, chills, sweats, weight loss, or infection.\par \par The patients past medical history, past surgical history, medications, allergies, and social history were reviewed by me today with the patient and documented accordingly.  In addition, the patient's family history, which is noncontributory to their visit, was also reviewed.\par

## 2022-09-21 NOTE — DISCUSSION/SUMMARY
[de-identified] : He suffers from lumbar radiculopathy refractory to conservative management with physical therapy.  I recommended a lumbar spine MRI.  Follow-up afterwards

## 2022-09-21 NOTE — PHYSICAL EXAM
[Antalgic] : antalgic [de-identified] : Examination of the lumbar spine reveals no midline tenderness palpation, step-offs, or skin lesions. Decreased range of motion with respect to flexion, extension, lateral bending, and rotation. No tenderness to palpation of the sciatic notch. No tenderness palpation of the bilateral greater trochanters. No pain with passive internal/external rotation of the hips. No instability of bilateral lower extremities.  Negative JES. Negative straight leg raise bilaterally. No bowstring. Negative femoral stretch.  4+ out of 5 iliopsoas, hip abductors, hips adductors, quadriceps, hamstrings, gastrocsoleus, tibialis anterior, extensor hallucis longus, peroneals. Grossly intact sensation to light touch bilateral lower extremities. 1+ patellar and Achilles reflexes. Downgoing Babinski. No clonus. Intact proprioception. Palpable pulses. No skin lesion and no edema on the right and left lower extremities. [de-identified] : Review of his recent AP lateral lumbar x-rays by me reveals some spondylosis without gross instability or aggressive lesions

## 2022-10-27 ENCOUNTER — APPOINTMENT (OUTPATIENT)
Dept: UROLOGY | Facility: CLINIC | Age: 52
End: 2022-10-27

## 2022-10-27 DIAGNOSIS — R06.02 SHORTNESS OF BREATH: ICD-10-CM

## 2022-10-27 DIAGNOSIS — K57.90 DIVERTICULOSIS OF INTESTINE, PART UNSPECIFIED, W/OUT PERFORATION OR ABSCESS W/OUT BLEEDING: ICD-10-CM

## 2022-10-27 DIAGNOSIS — M54.16 RADICULOPATHY, LUMBAR REGION: ICD-10-CM

## 2022-10-27 DIAGNOSIS — F17.210 NICOTINE DEPENDENCE, CIGARETTES, UNCOMPLICATED: ICD-10-CM

## 2022-10-27 DIAGNOSIS — M47.817 SPONDYLOSIS W/OUT MYELOPATHY OR RADICULOPATHY, LUMBOSACRAL REGION: ICD-10-CM

## 2022-10-27 DIAGNOSIS — N40.1 BENIGN PROSTATIC HYPERPLASIA WITH LOWER URINARY TRACT SYMPMS: ICD-10-CM

## 2022-10-27 DIAGNOSIS — R35.0 FREQUENCY OF MICTURITION: ICD-10-CM

## 2022-10-27 DIAGNOSIS — Z00.00 ENCOUNTER FOR GENERAL ADULT MEDICAL EXAMINATION W/OUT ABNORMAL FINDINGS: ICD-10-CM

## 2022-10-27 PROCEDURE — 99204 OFFICE O/P NEW MOD 45 MIN: CPT

## 2022-10-27 RX ORDER — HYDROCHLOROTHIAZIDE 12.5 MG/1
12.5 TABLET ORAL
Qty: 90 | Refills: 0 | Status: ACTIVE | COMMUNITY
Start: 2022-10-11

## 2022-10-27 RX ORDER — TAMSULOSIN HYDROCHLORIDE 0.4 MG/1
0.4 CAPSULE ORAL
Qty: 30 | Refills: 3 | Status: ACTIVE | COMMUNITY
Start: 2022-10-27 | End: 1900-01-01

## 2022-10-30 LAB
ANION GAP SERPL CALC-SCNC: 13 MMOL/L
APPEARANCE: CLEAR
BACTERIA: NEGATIVE
BILIRUBIN URINE: NEGATIVE
BLOOD URINE: NEGATIVE
BUN SERPL-MCNC: 12 MG/DL
CALCIUM SERPL-MCNC: 10 MG/DL
CHLORIDE SERPL-SCNC: 100 MMOL/L
CO2 SERPL-SCNC: 25 MMOL/L
COLOR: NORMAL
CREAT SERPL-MCNC: 0.8 MG/DL
EGFR: 107 ML/MIN/1.73M2
GLUCOSE QUALITATIVE U: NEGATIVE
GLUCOSE SERPL-MCNC: 104 MG/DL
HYALINE CASTS: 0 /LPF
KETONES URINE: NEGATIVE
LEUKOCYTE ESTERASE URINE: NEGATIVE
MICROSCOPIC-UA: NORMAL
NITRITE URINE: NEGATIVE
PH URINE: 6
POTASSIUM SERPL-SCNC: 4.4 MMOL/L
PROTEIN URINE: NEGATIVE
PSA FREE FLD-MCNC: 23 %
PSA FREE SERPL-MCNC: 0.17 NG/ML
PSA SERPL-MCNC: 0.74 NG/ML
RED BLOOD CELLS URINE: 0 /HPF
SODIUM SERPL-SCNC: 138 MMOL/L
SPECIFIC GRAVITY URINE: 1.01
SQUAMOUS EPITHELIAL CELLS: 0 /HPF
UROBILINOGEN URINE: NORMAL
WHITE BLOOD CELLS URINE: 0 /HPF

## 2022-11-03 NOTE — ADDENDUM
[FreeTextEntry1] : Entered by Alex Bowman, acting as scribe for Dr. Jarrod Hargrove.\par The documentation recorded by the scribe accurately reflects the service I personally performed and the decisions made by me.

## 2022-11-03 NOTE — LETTER BODY
[FreeTextEntry1] : Chana Trinidad \par [id number] [facility] [address]\[place] [address]\par [phone]\par \par Dear Dr. Trinidad,\par \par Reason for Visit: BPH.\par \par This is a 52 year-old gentleman with symptoms of BPH. Patient is here today for evaluation. Patient reports he has weak uroflow, frequency, and hesitancy. He denies any hematuria or urinary incontinence. His symptoms are aggravated by hydration. He denies any alleviating factors. He has [not] tried any medical therapy previously. He reports no pain. All other review of systems are negative. He has no cancer in his family medical history. He has no previous surgical history. Past medical history, family history and social history were inquired and were noncontributory to current condition. The patient reports being a former smoker. Medications and allergies were reviewed. He has no known allergies to medication. \par \par On examination, the patient is a healthy-appearing gentleman in no acute distress. He is alert and oriented follows commands. He has normal mood and affect. He is normocephalic. Neck is supple. Oral no thrush Respirations are unlabored. His abdomen is soft and nontender. Bladder is nonpalpable. No CVA tenderness. Neurologically he is grossly intact. No peripheral edema. Skin without gross abnormality. He has normal male external genitalia. Normal meatus. Bilateral testes are descended intrascrotally and normal to palpation. On rectal examination, there is normal sphincter tone. The prostate is clinically benign without focal induration or nodularity.\par \par Post-void residual on bladder scan today was 0 cc.\par \par ASSESSMENT: BPH.\par \par I counseled the patient on the various etiology of his symptoms. I discussed the natural history of BPH and the treatment options available. I discussed the options of conservative management with fluid in dietary restrictions, herbal therapy, medical therapy, and minimally invasive procedures. Risk and benefits were discussed. I answered his questions. I recommended he try Flomax. I discussed the potential side effects of the medication. I counseled the patient on its use and side effects. If the patient develops any side effects, the patient will discontinue the medication and contact me. Risks and alternatives were discussed. I answered the patient questions. The patient will follow-up as directed and will contact me with any questions or concerns. Thank you for the opportunity to participate in the care of Mr. CARSON. I will keep you updated on his progress.\par \par Plan: Trial of Flomax. Follow up in 1 month.

## 2022-12-09 ENCOUNTER — APPOINTMENT (OUTPATIENT)
Dept: ORTHOPEDIC SURGERY | Facility: CLINIC | Age: 52
End: 2022-12-09

## 2023-01-05 ENCOUNTER — EMERGENCY (EMERGENCY)
Facility: HOSPITAL | Age: 53
LOS: 1 days | Discharge: LEFT BEFORE DISCHARGE | End: 2023-01-05
Admitting: EMERGENCY MEDICINE
Payer: COMMERCIAL

## 2023-01-05 VITALS
OXYGEN SATURATION: 100 % | SYSTOLIC BLOOD PRESSURE: 143 MMHG | RESPIRATION RATE: 16 BRPM | HEART RATE: 86 BPM | TEMPERATURE: 98 F | DIASTOLIC BLOOD PRESSURE: 95 MMHG

## 2023-01-05 PROCEDURE — L9991: CPT

## 2023-01-05 NOTE — ED ADULT TRIAGE NOTE - CHIEF COMPLAINT QUOTE
Pt. c/o RLQ abdominal pain radiating to back since 12/24. states he was diagnosed with diverticulitis. Denies hematuria.  PHx HTN

## 2023-01-06 ENCOUNTER — INPATIENT (INPATIENT)
Facility: HOSPITAL | Age: 53
LOS: 3 days | Discharge: HOME | DRG: 301 | End: 2023-01-10
Attending: SURGERY | Admitting: SURGERY
Payer: COMMERCIAL

## 2023-01-06 VITALS
HEIGHT: 66 IN | WEIGHT: 162.92 LBS | DIASTOLIC BLOOD PRESSURE: 103 MMHG | OXYGEN SATURATION: 99 % | HEART RATE: 78 BPM | SYSTOLIC BLOOD PRESSURE: 145 MMHG | RESPIRATION RATE: 20 BRPM | TEMPERATURE: 99 F

## 2023-01-06 LAB
ALBUMIN SERPL ELPH-MCNC: 4.7 G/DL — SIGNIFICANT CHANGE UP (ref 3.3–5)
ALP SERPL-CCNC: 55 U/L — SIGNIFICANT CHANGE UP (ref 40–120)
ALT FLD-CCNC: 47 U/L — HIGH (ref 10–45)
ANION GAP SERPL CALC-SCNC: 10 MMOL/L — SIGNIFICANT CHANGE UP (ref 5–17)
AST SERPL-CCNC: 22 U/L — SIGNIFICANT CHANGE UP (ref 10–40)
BASE EXCESS BLDV CALC-SCNC: 2.4 MMOL/L — SIGNIFICANT CHANGE UP (ref -2–3)
BASE EXCESS BLDV CALC-SCNC: 2.8 MMOL/L — SIGNIFICANT CHANGE UP (ref -2–3)
BASOPHILS # BLD AUTO: 0.08 K/UL — SIGNIFICANT CHANGE UP (ref 0–0.2)
BASOPHILS NFR BLD AUTO: 1.1 % — SIGNIFICANT CHANGE UP (ref 0–2)
BILIRUB SERPL-MCNC: 0.9 MG/DL — SIGNIFICANT CHANGE UP (ref 0.2–1.2)
BUN SERPL-MCNC: 10 MG/DL — SIGNIFICANT CHANGE UP (ref 7–23)
CA-I SERPL-SCNC: 1.2 MMOL/L — SIGNIFICANT CHANGE UP (ref 1.15–1.33)
CA-I SERPL-SCNC: 1.26 MMOL/L — SIGNIFICANT CHANGE UP (ref 1.15–1.33)
CALCIUM SERPL-MCNC: 9.9 MG/DL — SIGNIFICANT CHANGE UP (ref 8.4–10.5)
CHLORIDE BLDV-SCNC: 100 MMOL/L — SIGNIFICANT CHANGE UP (ref 96–108)
CHLORIDE BLDV-SCNC: 99 MMOL/L — SIGNIFICANT CHANGE UP (ref 96–108)
CHLORIDE SERPL-SCNC: 101 MMOL/L — SIGNIFICANT CHANGE UP (ref 96–108)
CO2 BLDV-SCNC: 30 MMOL/L — HIGH (ref 22–26)
CO2 BLDV-SCNC: 30 MMOL/L — HIGH (ref 22–26)
CO2 SERPL-SCNC: 26 MMOL/L — SIGNIFICANT CHANGE UP (ref 22–31)
CREAT SERPL-MCNC: 0.94 MG/DL — SIGNIFICANT CHANGE UP (ref 0.5–1.3)
EGFR: 98 ML/MIN/1.73M2 — SIGNIFICANT CHANGE UP
EOSINOPHIL # BLD AUTO: 0.18 K/UL — SIGNIFICANT CHANGE UP (ref 0–0.5)
EOSINOPHIL NFR BLD AUTO: 2.6 % — SIGNIFICANT CHANGE UP (ref 0–6)
FLUAV AG NPH QL: SIGNIFICANT CHANGE UP
FLUBV AG NPH QL: SIGNIFICANT CHANGE UP
GAS PNL BLDV: 133 MMOL/L — LOW (ref 136–145)
GAS PNL BLDV: 136 MMOL/L — SIGNIFICANT CHANGE UP (ref 136–145)
GAS PNL BLDV: SIGNIFICANT CHANGE UP
GLUCOSE BLDV-MCNC: 106 MG/DL — HIGH (ref 70–99)
GLUCOSE BLDV-MCNC: 91 MG/DL — SIGNIFICANT CHANGE UP (ref 70–99)
GLUCOSE SERPL-MCNC: 115 MG/DL — HIGH (ref 70–99)
HCO3 BLDV-SCNC: 28 MMOL/L — SIGNIFICANT CHANGE UP (ref 22–29)
HCO3 BLDV-SCNC: 29 MMOL/L — SIGNIFICANT CHANGE UP (ref 22–29)
HCT VFR BLD CALC: 43.3 % — SIGNIFICANT CHANGE UP (ref 39–50)
HCT VFR BLDA CALC: 43 % — SIGNIFICANT CHANGE UP (ref 39–51)
HCT VFR BLDA CALC: 46 % — SIGNIFICANT CHANGE UP (ref 39–51)
HGB BLD CALC-MCNC: 14.3 G/DL — SIGNIFICANT CHANGE UP (ref 12.6–17.4)
HGB BLD CALC-MCNC: 15.2 G/DL — SIGNIFICANT CHANGE UP (ref 12.6–17.4)
HGB BLD-MCNC: 14.6 G/DL — SIGNIFICANT CHANGE UP (ref 13–17)
IMM GRANULOCYTES NFR BLD AUTO: 0.1 % — SIGNIFICANT CHANGE UP (ref 0–0.9)
LACTATE BLDV-MCNC: 1.1 MMOL/L — SIGNIFICANT CHANGE UP (ref 0.5–2)
LACTATE BLDV-MCNC: 1.1 MMOL/L — SIGNIFICANT CHANGE UP (ref 0.5–2)
LIDOCAIN IGE QN: 20 U/L — SIGNIFICANT CHANGE UP (ref 7–60)
LYMPHOCYTES # BLD AUTO: 1.96 K/UL — SIGNIFICANT CHANGE UP (ref 1–3.3)
LYMPHOCYTES # BLD AUTO: 28 % — SIGNIFICANT CHANGE UP (ref 13–44)
MCHC RBC-ENTMCNC: 30.4 PG — SIGNIFICANT CHANGE UP (ref 27–34)
MCHC RBC-ENTMCNC: 33.7 GM/DL — SIGNIFICANT CHANGE UP (ref 32–36)
MCV RBC AUTO: 90.2 FL — SIGNIFICANT CHANGE UP (ref 80–100)
MONOCYTES # BLD AUTO: 0.42 K/UL — SIGNIFICANT CHANGE UP (ref 0–0.9)
MONOCYTES NFR BLD AUTO: 6 % — SIGNIFICANT CHANGE UP (ref 2–14)
NEUTROPHILS # BLD AUTO: 4.35 K/UL — SIGNIFICANT CHANGE UP (ref 1.8–7.4)
NEUTROPHILS NFR BLD AUTO: 62.2 % — SIGNIFICANT CHANGE UP (ref 43–77)
NRBC # BLD: 0 /100 WBCS — SIGNIFICANT CHANGE UP (ref 0–0)
PCO2 BLDV: 48 MMHG — SIGNIFICANT CHANGE UP (ref 42–55)
PCO2 BLDV: 49 MMHG — SIGNIFICANT CHANGE UP (ref 42–55)
PH BLDV: 7.38 — SIGNIFICANT CHANGE UP (ref 7.32–7.43)
PH BLDV: 7.38 — SIGNIFICANT CHANGE UP (ref 7.32–7.43)
PLATELET # BLD AUTO: 311 K/UL — SIGNIFICANT CHANGE UP (ref 150–400)
PO2 BLDV: 33 MMHG — SIGNIFICANT CHANGE UP (ref 25–45)
PO2 BLDV: 42 MMHG — SIGNIFICANT CHANGE UP (ref 25–45)
POTASSIUM BLDV-SCNC: 3.6 MMOL/L — SIGNIFICANT CHANGE UP (ref 3.5–5.1)
POTASSIUM BLDV-SCNC: 3.9 MMOL/L — SIGNIFICANT CHANGE UP (ref 3.5–5.1)
POTASSIUM SERPL-MCNC: 3.9 MMOL/L — SIGNIFICANT CHANGE UP (ref 3.5–5.3)
POTASSIUM SERPL-SCNC: 3.9 MMOL/L — SIGNIFICANT CHANGE UP (ref 3.5–5.3)
PROT SERPL-MCNC: 7.7 G/DL — SIGNIFICANT CHANGE UP (ref 6–8.3)
RBC # BLD: 4.8 M/UL — SIGNIFICANT CHANGE UP (ref 4.2–5.8)
RBC # FLD: 11.9 % — SIGNIFICANT CHANGE UP (ref 10.3–14.5)
RSV RNA NPH QL NAA+NON-PROBE: SIGNIFICANT CHANGE UP
SAO2 % BLDV: 55.8 % — LOW (ref 67–88)
SAO2 % BLDV: 74.3 % — SIGNIFICANT CHANGE UP (ref 67–88)
SARS-COV-2 RNA SPEC QL NAA+PROBE: SIGNIFICANT CHANGE UP
SODIUM SERPL-SCNC: 137 MMOL/L — SIGNIFICANT CHANGE UP (ref 135–145)
WBC # BLD: 7 K/UL — SIGNIFICANT CHANGE UP (ref 3.8–10.5)
WBC # FLD AUTO: 7 K/UL — SIGNIFICANT CHANGE UP (ref 3.8–10.5)

## 2023-01-06 PROCEDURE — 74177 CT ABD & PELVIS W/CONTRAST: CPT | Mod: 26,59,MA

## 2023-01-06 PROCEDURE — 99285 EMERGENCY DEPT VISIT HI MDM: CPT

## 2023-01-06 PROCEDURE — 74174 CTA ABD&PLVS W/CONTRAST: CPT | Mod: 26,MA

## 2023-01-06 RX ORDER — SODIUM CHLORIDE 9 MG/ML
1000 INJECTION, SOLUTION INTRAVENOUS ONCE
Refills: 0 | Status: COMPLETED | OUTPATIENT
Start: 2023-01-06 | End: 2023-01-06

## 2023-01-06 RX ADMIN — SODIUM CHLORIDE 1000 MILLILITER(S): 9 INJECTION, SOLUTION INTRAVENOUS at 11:30

## 2023-01-06 NOTE — ED PROVIDER NOTE - PHYSICAL EXAMINATION
Attn - alert, NAD, no pallor or jaundice, PERRL 3 mm, moist mm, skin - warm and dry, Lungs - clear, no w/r/r, good BS bilaterally, Cor - rr, no M, no rub, Abdo - ND, soft, generalized abdo tenderness with max periumbilical, no HSM, no CVAT, no guarding or rebound. Extremities - no edema, no calf tenderness, distal pulses intact and symmetrical, Neuro - intact and non-focal

## 2023-01-06 NOTE — ED ADULT NURSE REASSESSMENT NOTE - NS ED NURSE REASSESS COMMENT FT1
Pt found to have suprapubic catheter. RN not authorized to change suprapubic catheter at bedside as per ANM. MD made aware

## 2023-01-06 NOTE — ED ADULT NURSE NOTE - OBJECTIVE STATEMENT
Presents to ED c/o abd pain x 15 months. Pt has had multiple rounds of testing and imaging as an outpt. He reports that since 12/24 he has been having intermittent pain to LLQ, RLQ, and periumbilical area. Pain is worse with PO intake. Nausea but no vomiting. Denies any constipation or diarrhea. Denies any fever or chills. Pt endorses that he usually drinks multiple times a week but has not drank in the last few days.

## 2023-01-06 NOTE — ED PROVIDER NOTE - NS ED ATTENDING STATEMENT MOD
This was a shared visit with the VASQUEZ. I reviewed and verified the documentation and independently performed the documented:

## 2023-01-06 NOTE — ED PROVIDER NOTE - CARE PLAN
Principal Discharge DX:	Undifferentiated abdominal pain   1 Principal Discharge DX:	Dissection of mesenteric artery

## 2023-01-06 NOTE — ED PROVIDER NOTE - PROGRESS NOTE DETAILS
ED sign out, stable nad, eval for chronic undifferentiated abd pain, pending CT and close reassessmetns for tx/dispo decisions -- Jarrod Garcia MD c/w Vascular Surgery for CTA SMA findings, pt stable soft abd, has pain w/ eating, -- Jarrod Garcia MD ED Sign Out, d/w Vascular Surgery, pending full consult and close reassessments for tx/dispo decision -- Jarrod Garcia MD MD Calder[place]:  case d/w Vascular surgery consult.  Recommending 81mg ASA; admit to Vasc Surg, Dr. CHATO Power.  Likely to require formal angiogram.

## 2023-01-06 NOTE — ED PROVIDER NOTE - OBJECTIVE STATEMENT
Attending note.  Patient was seen in room #22 to the left.  Patient has had an intermittent abdominal pain for the last 15 months.  On December 24 he began to experience pain in the right lower quadrant.  He currently is complaining of intermittent abdominal pain in the left lower quadrant, right lower quadrant and periumbilical area.  He also reports nausea and increased pain with p.o. intake.  He reports a small stool size but no constipation or diarrhea.  He denies any melena.  He denies any vomiting.  He denies any fevers, chills, rigors or sweats.  He denies any respiratory or  symptoms.  Over the last duration of the onset of his pain he reports having a negative endoscopy and colonoscopy.  He also reports what he thinks was an MRI possibly 1 year ago which she states was negative.  He describes himself as an alcoholic.  He has not drank in the last several days.  He denies any abdominal surgery.  He has a history of hypertension and hypercholesterolemia as well.  He denies any allergies.

## 2023-01-07 DIAGNOSIS — I77.79 DISSECTION OF OTHER SPECIFIED ARTERY: ICD-10-CM

## 2023-01-07 LAB
ANION GAP SERPL CALC-SCNC: 14 MMOL/L — SIGNIFICANT CHANGE UP (ref 5–17)
APPEARANCE UR: CLEAR — SIGNIFICANT CHANGE UP
APTT BLD: 33.9 SEC — SIGNIFICANT CHANGE UP (ref 27.5–35.5)
APTT BLD: 36 SEC — HIGH (ref 27.5–35.5)
BILIRUB UR-MCNC: NEGATIVE — SIGNIFICANT CHANGE UP
BUN SERPL-MCNC: 10 MG/DL — SIGNIFICANT CHANGE UP (ref 7–23)
CALCIUM SERPL-MCNC: 9.8 MG/DL — SIGNIFICANT CHANGE UP (ref 8.4–10.5)
CHLORIDE SERPL-SCNC: 99 MMOL/L — SIGNIFICANT CHANGE UP (ref 96–108)
CO2 SERPL-SCNC: 23 MMOL/L — SIGNIFICANT CHANGE UP (ref 22–31)
COLOR SPEC: SIGNIFICANT CHANGE UP
CREAT SERPL-MCNC: 0.81 MG/DL — SIGNIFICANT CHANGE UP (ref 0.5–1.3)
DIFF PNL FLD: NEGATIVE — SIGNIFICANT CHANGE UP
EGFR: 107 ML/MIN/1.73M2 — SIGNIFICANT CHANGE UP
GLUCOSE SERPL-MCNC: 109 MG/DL — HIGH (ref 70–99)
GLUCOSE UR QL: NEGATIVE — SIGNIFICANT CHANGE UP
HCT VFR BLD CALC: 43.4 % — SIGNIFICANT CHANGE UP (ref 39–50)
HGB BLD-MCNC: 14.6 G/DL — SIGNIFICANT CHANGE UP (ref 13–17)
KETONES UR-MCNC: SIGNIFICANT CHANGE UP
LEUKOCYTE ESTERASE UR-ACNC: NEGATIVE — SIGNIFICANT CHANGE UP
MAGNESIUM SERPL-MCNC: 2.1 MG/DL — SIGNIFICANT CHANGE UP (ref 1.6–2.6)
MCHC RBC-ENTMCNC: 30.2 PG — SIGNIFICANT CHANGE UP (ref 27–34)
MCHC RBC-ENTMCNC: 33.6 GM/DL — SIGNIFICANT CHANGE UP (ref 32–36)
MCV RBC AUTO: 89.7 FL — SIGNIFICANT CHANGE UP (ref 80–100)
NITRITE UR-MCNC: NEGATIVE — SIGNIFICANT CHANGE UP
NRBC # BLD: 0 /100 WBCS — SIGNIFICANT CHANGE UP (ref 0–0)
PH UR: 6 — SIGNIFICANT CHANGE UP (ref 5–8)
PHOSPHATE SERPL-MCNC: 3.5 MG/DL — SIGNIFICANT CHANGE UP (ref 2.5–4.5)
PLATELET # BLD AUTO: 291 K/UL — SIGNIFICANT CHANGE UP (ref 150–400)
POTASSIUM SERPL-MCNC: 3.8 MMOL/L — SIGNIFICANT CHANGE UP (ref 3.5–5.3)
POTASSIUM SERPL-SCNC: 3.8 MMOL/L — SIGNIFICANT CHANGE UP (ref 3.5–5.3)
PROT UR-MCNC: NEGATIVE — SIGNIFICANT CHANGE UP
RBC # BLD: 4.84 M/UL — SIGNIFICANT CHANGE UP (ref 4.2–5.8)
RBC # FLD: 11.9 % — SIGNIFICANT CHANGE UP (ref 10.3–14.5)
SODIUM SERPL-SCNC: 136 MMOL/L — SIGNIFICANT CHANGE UP (ref 135–145)
SP GR SPEC: 1.02 — SIGNIFICANT CHANGE UP (ref 1.01–1.02)
UROBILINOGEN FLD QL: NEGATIVE — SIGNIFICANT CHANGE UP
WBC # BLD: 5.99 K/UL — SIGNIFICANT CHANGE UP (ref 3.8–10.5)
WBC # FLD AUTO: 5.99 K/UL — SIGNIFICANT CHANGE UP (ref 3.8–10.5)

## 2023-01-07 PROCEDURE — 99223 1ST HOSP IP/OBS HIGH 75: CPT

## 2023-01-07 RX ORDER — ROSUVASTATIN CALCIUM 5 MG/1
1 TABLET ORAL
Qty: 0 | Refills: 0 | DISCHARGE

## 2023-01-07 RX ORDER — GABAPENTIN 400 MG/1
0 CAPSULE ORAL
Qty: 0 | Refills: 0 | DISCHARGE

## 2023-01-07 RX ORDER — ACETAMINOPHEN 500 MG
1000 TABLET ORAL ONCE
Refills: 0 | Status: COMPLETED | OUTPATIENT
Start: 2023-01-07 | End: 2023-01-07

## 2023-01-07 RX ORDER — HYDROCHLOROTHIAZIDE 25 MG
1 TABLET ORAL
Qty: 0 | Refills: 0 | DISCHARGE

## 2023-01-07 RX ORDER — SERTRALINE 25 MG/1
1 TABLET, FILM COATED ORAL
Qty: 0 | Refills: 0 | DISCHARGE

## 2023-01-07 RX ORDER — ATORVASTATIN CALCIUM 80 MG/1
20 TABLET, FILM COATED ORAL AT BEDTIME
Refills: 0 | Status: DISCONTINUED | OUTPATIENT
Start: 2023-01-07 | End: 2023-01-10

## 2023-01-07 RX ORDER — LOSARTAN POTASSIUM 100 MG/1
100 TABLET, FILM COATED ORAL DAILY
Refills: 0 | Status: DISCONTINUED | OUTPATIENT
Start: 2023-01-07 | End: 2023-01-07

## 2023-01-07 RX ORDER — PANTOPRAZOLE SODIUM 20 MG/1
40 TABLET, DELAYED RELEASE ORAL
Refills: 0 | Status: DISCONTINUED | OUTPATIENT
Start: 2023-01-07 | End: 2023-01-10

## 2023-01-07 RX ORDER — HEPARIN SODIUM 5000 [USP'U]/ML
300 INJECTION INTRAVENOUS; SUBCUTANEOUS
Qty: 25000 | Refills: 0 | Status: DISCONTINUED | OUTPATIENT
Start: 2023-01-07 | End: 2023-01-08

## 2023-01-07 RX ORDER — ENOXAPARIN SODIUM 100 MG/ML
40 INJECTION SUBCUTANEOUS EVERY 24 HOURS
Refills: 0 | Status: DISCONTINUED | OUTPATIENT
Start: 2023-01-07 | End: 2023-01-07

## 2023-01-07 RX ORDER — ALPRAZOLAM 0.25 MG
1 TABLET ORAL
Qty: 0 | Refills: 0 | DISCHARGE

## 2023-01-07 RX ORDER — LOSARTAN POTASSIUM 100 MG/1
100 TABLET, FILM COATED ORAL DAILY
Refills: 0 | Status: DISCONTINUED | OUTPATIENT
Start: 2023-01-07 | End: 2023-01-10

## 2023-01-07 RX ORDER — ASPIRIN/CALCIUM CARB/MAGNESIUM 324 MG
81 TABLET ORAL EVERY 24 HOURS
Refills: 0 | Status: DISCONTINUED | OUTPATIENT
Start: 2023-01-07 | End: 2023-01-10

## 2023-01-07 RX ORDER — IRBESARTAN 75 MG/1
1 TABLET ORAL
Qty: 0 | Refills: 0 | DISCHARGE

## 2023-01-07 RX ORDER — OMEPRAZOLE 10 MG/1
1 CAPSULE, DELAYED RELEASE ORAL
Qty: 0 | Refills: 0 | DISCHARGE

## 2023-01-07 RX ADMIN — LOSARTAN POTASSIUM 100 MILLIGRAM(S): 100 TABLET, FILM COATED ORAL at 05:02

## 2023-01-07 RX ADMIN — HEPARIN SODIUM 3 UNIT(S)/HR: 5000 INJECTION INTRAVENOUS; SUBCUTANEOUS at 14:07

## 2023-01-07 RX ADMIN — Medication 400 MILLIGRAM(S): at 04:55

## 2023-01-07 RX ADMIN — HEPARIN SODIUM 3 UNIT(S)/HR: 5000 INJECTION INTRAVENOUS; SUBCUTANEOUS at 19:46

## 2023-01-07 RX ADMIN — HEPARIN SODIUM 6 UNIT(S)/HR: 5000 INJECTION INTRAVENOUS; SUBCUTANEOUS at 22:52

## 2023-01-07 RX ADMIN — ATORVASTATIN CALCIUM 20 MILLIGRAM(S): 80 TABLET, FILM COATED ORAL at 21:03

## 2023-01-07 RX ADMIN — PANTOPRAZOLE SODIUM 40 MILLIGRAM(S): 20 TABLET, DELAYED RELEASE ORAL at 05:02

## 2023-01-07 RX ADMIN — Medication 1000 MILLIGRAM(S): at 05:00

## 2023-01-07 RX ADMIN — ENOXAPARIN SODIUM 40 MILLIGRAM(S): 100 INJECTION SUBCUTANEOUS at 05:01

## 2023-01-07 RX ADMIN — Medication 81 MILLIGRAM(S): at 05:00

## 2023-01-07 NOTE — H&P ADULT - NSHPLABSRESULTS_GEN_ALL_CORE
14.6   7.00  )-----------( 311      ( 06 Jan 2023 11:17 )             43.3     01-06    137  |  101  |  10  ----------------------------<  115<H>  3.9   |  26  |  0.94    Ca    9.9      06 Jan 2023 11:17    TPro  7.7  /  Alb  4.7  /  TBili  0.9  /  DBili  x   /  AST  22  /  ALT  47<H>  /  AlkPhos  55  01-06      IMAGING:  < from: CT Angio Abdomen and Pelvis w/ IV Cont (01.06.23 @ 20:58) >      ACC: [id number] EXAM:  CT ANGIO ABD PELV (W)AW IC                          PROCEDURE DATE:  01/06/2023          INTERPRETATION:  CLINICAL INFORMATION: Dissection of superior mesenteric   artery at same day prior exam.    COMPARISON: CT abdomen/pelvis1/6/2023, 4:41 PM.    CONTRAST/COMPLICATIONS:  IV Contrast: Omnipaque 350  90 cc administered   10 cc discarded  Oral Contrast: NONE  Complications: None reported at time of study completion    PROCEDURE:  CT Angiography of the Abdomen and Pelvis wasperformed.  Arterial and venous phases were acquired.  Sagittal and coronal reformats were performed as well as 3D (MIP)   reconstructions.    FINDINGS:  LOWER CHEST: Bibasilar subsegmental dependent atelectasis.    LIVER: Bilobar subcentimeter hypodensities, too small to characterize.  BILE DUCTS: Normal caliber.  GALLBLADDER: Within normal limits.  SPLEEN: Within normal limits.  PANCREAS: Within normal limits.  ADRENALS: Within normal limits.  KIDNEYS/URETERS: Within normal limits.    BLADDER: Contrast opacified.  REPRODUCTIVE ORGANS: Prostate is mildly enlarged.    BOWEL: No bowel obstruction. Bowel wall enhancement is preserved. Bowel   wall enhancement is preserved. No pneumatosis. Appendix is normal.  PERITONEUM: No ascites.  VESSELS: Mild atherosclerotic changes. Trace perivascular fat stranding   and a short segment dissection flap extending from SMA origin to the   level of takeoff of the second jejunal branch (601/64-66) with preserved   distal flow, without significant change as compared to prior same day   exam.  RETROPERITONEUM/LYMPH NODES: No lymphadenopathy.  ABDOMINAL WALL: Within normal limits.  BONES: Degenerative changes. Straightened lumbar lordosis.    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 No evidence of mesenteric ischemia.        --- End of Report ---           SAYDA CROCKER MD; Resident Radiologist  This document has been electronically signed.  LAKSHMI CONNELL MD; Attending Radiologist  This document has been electronically signed. Jan 6 2023 11:12PM    < end of copied text >

## 2023-01-07 NOTE — CHART NOTE - NSCHARTNOTEFT_GEN_A_CORE
I was called to exam the patient:  abd soft, nontender, nondistended, no rebound or guarding. Patient describes abdominal and back pain that is not effected by palpation.    IV tylenol was given

## 2023-01-07 NOTE — PATIENT PROFILE ADULT - FALL HARM RISK - HARM RISK INTERVENTIONS
Assistance with ambulation/Assistance OOB with selected safe patient handling equipment/Communicate Risk of Fall with Harm to all staff/Orthostatic vital signs/Reinforce activity limits and safety measures with patient and family/Tailored Fall Risk Interventions/Visual Cue: Yellow wristband and red socks/Bed in lowest position, wheels locked, appropriate side rails in place/Call bell, personal items and telephone in reach/Instruct patient to call for assistance before getting out of bed or chair/Non-slip footwear when patient is out of bed/[place] to call system/Physically safe environment - no spills, clutter or unnecessary equipment/Purposeful Proactive Rounding/Room/bathroom lighting operational, light cord in reach

## 2023-01-07 NOTE — H&P ADULT - HISTORY OF PRESENT ILLNESS
Patient is a 52 year old male with PMHx significant for HTN and HLD presenting with abdominal pain. He has been having intermittent abdominal pain since November 2021. Since 12/24/2022, the pain has been getting more intense and episodes have been more frequent. Abdominal pain is localized to LLQ, RLQ, and periumbilical areas. Usually post prandial. Denies nausea, vomiting, diarrhea, fevers, and chills. Having BMs and passing flatus.    In ED, on CT with IV contrast and CTA, he is found to have a SMA dissection with adequate distal perfusion and no evidence of bowel compromise.

## 2023-01-07 NOTE — H&P ADULT - NSHPPHYSICALEXAM_GEN_ALL_CORE
Gen: NAD  CV: Regular rate  Resp: Nonlabored breathing on room air  Abd: Soft, nondistended, mild diffuse tenderness to deep palpation, non-peritoneal, no rebound or guarding  Ext: Warm

## 2023-01-07 NOTE — PATIENT PROFILE ADULT - ARE SIGNIFICANT INDICATORS COMPLETE.
ESRD on HD:  Pt was seen and examined on dialysis. No symptoms.   Hemodynamics stable. Tolerating dialysis and ultrafiltration.  Pre Laboratory values personally reviewed by me.  Dialysis adjusted appropriately based on current values.  Will continue the current medical management.  Next hemodialysis as scheduled.  Discussed with nursing, primary care team. 
Yes

## 2023-01-07 NOTE — H&P ADULT - ASSESSMENT
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    Plan:  - Admit to Vascular Surgery, Dr. Power  - ASA 81/Antiplatelet therapy  - CLD  - Serial abdominal exams  - Patient unlikely to need urgent intervention at this time, but given persistent pain, would likely benefit from angiogram and intervention in the future    Discussed with vascular surgery fellow, Dr. Mccallum, on behalf of vascular surgery attending on call, Dr Power.      Vascular Surgery  [id number]

## 2023-01-07 NOTE — PROVIDER CONTACT NOTE (MEDICATION) - ACTION/TREATMENT ORDERED:
Provider Havasy notified per MD, Hydrochlorthiazide not ordered. Day team can restart in morning if deemed necessary

## 2023-01-08 LAB
ANION GAP SERPL CALC-SCNC: 12 MMOL/L — SIGNIFICANT CHANGE UP (ref 5–17)
APTT BLD: 50.2 SEC — HIGH (ref 27.5–35.5)
APTT BLD: 57.6 SEC — HIGH (ref 27.5–35.5)
APTT BLD: 62.5 SEC — HIGH (ref 27.5–35.5)
APTT BLD: 82.8 SEC — HIGH (ref 27.5–35.5)
BUN SERPL-MCNC: 9 MG/DL — SIGNIFICANT CHANGE UP (ref 7–23)
CALCIUM SERPL-MCNC: 10.3 MG/DL — SIGNIFICANT CHANGE UP (ref 8.4–10.5)
CHLORIDE SERPL-SCNC: 101 MMOL/L — SIGNIFICANT CHANGE UP (ref 96–108)
CO2 SERPL-SCNC: 26 MMOL/L — SIGNIFICANT CHANGE UP (ref 22–31)
CREAT SERPL-MCNC: 0.83 MG/DL — SIGNIFICANT CHANGE UP (ref 0.5–1.3)
EGFR: 106 ML/MIN/1.73M2 — SIGNIFICANT CHANGE UP
GLUCOSE SERPL-MCNC: 138 MG/DL — HIGH (ref 70–99)
HCT VFR BLD CALC: 44.9 % — SIGNIFICANT CHANGE UP (ref 39–50)
HCT VFR BLD CALC: 47.9 % — SIGNIFICANT CHANGE UP (ref 39–50)
HGB BLD-MCNC: 15.2 G/DL — SIGNIFICANT CHANGE UP (ref 13–17)
HGB BLD-MCNC: 16.1 G/DL — SIGNIFICANT CHANGE UP (ref 13–17)
MCHC RBC-ENTMCNC: 30.3 PG — SIGNIFICANT CHANGE UP (ref 27–34)
MCHC RBC-ENTMCNC: 30.3 PG — SIGNIFICANT CHANGE UP (ref 27–34)
MCHC RBC-ENTMCNC: 33.6 GM/DL — SIGNIFICANT CHANGE UP (ref 32–36)
MCHC RBC-ENTMCNC: 33.9 GM/DL — SIGNIFICANT CHANGE UP (ref 32–36)
MCV RBC AUTO: 89.6 FL — SIGNIFICANT CHANGE UP (ref 80–100)
MCV RBC AUTO: 90.2 FL — SIGNIFICANT CHANGE UP (ref 80–100)
NRBC # BLD: 0 /100 WBCS — SIGNIFICANT CHANGE UP (ref 0–0)
NRBC # BLD: 0 /100 WBCS — SIGNIFICANT CHANGE UP (ref 0–0)
PLATELET # BLD AUTO: 318 K/UL — SIGNIFICANT CHANGE UP (ref 150–400)
PLATELET # BLD AUTO: 338 K/UL — SIGNIFICANT CHANGE UP (ref 150–400)
POTASSIUM SERPL-MCNC: 3.8 MMOL/L — SIGNIFICANT CHANGE UP (ref 3.5–5.3)
POTASSIUM SERPL-SCNC: 3.8 MMOL/L — SIGNIFICANT CHANGE UP (ref 3.5–5.3)
RBC # BLD: 5.01 M/UL — SIGNIFICANT CHANGE UP (ref 4.2–5.8)
RBC # BLD: 5.31 M/UL — SIGNIFICANT CHANGE UP (ref 4.2–5.8)
RBC # FLD: 11.8 % — SIGNIFICANT CHANGE UP (ref 10.3–14.5)
RBC # FLD: 11.8 % — SIGNIFICANT CHANGE UP (ref 10.3–14.5)
SODIUM SERPL-SCNC: 139 MMOL/L — SIGNIFICANT CHANGE UP (ref 135–145)
WBC # BLD: 4.49 K/UL — SIGNIFICANT CHANGE UP (ref 3.8–10.5)
WBC # BLD: 6.33 K/UL — SIGNIFICANT CHANGE UP (ref 3.8–10.5)
WBC # FLD AUTO: 4.49 K/UL — SIGNIFICANT CHANGE UP (ref 3.8–10.5)
WBC # FLD AUTO: 6.33 K/UL — SIGNIFICANT CHANGE UP (ref 3.8–10.5)

## 2023-01-08 PROCEDURE — 93010 ELECTROCARDIOGRAM REPORT: CPT

## 2023-01-08 RX ORDER — HEPARIN SODIUM 5000 [USP'U]/ML
900 INJECTION INTRAVENOUS; SUBCUTANEOUS
Qty: 25000 | Refills: 0 | Status: DISCONTINUED | OUTPATIENT
Start: 2023-01-08 | End: 2023-01-10

## 2023-01-08 RX ADMIN — Medication 81 MILLIGRAM(S): at 05:57

## 2023-01-08 RX ADMIN — ATORVASTATIN CALCIUM 20 MILLIGRAM(S): 80 TABLET, FILM COATED ORAL at 21:11

## 2023-01-08 RX ADMIN — HEPARIN SODIUM 900 UNIT(S)/HR: 5000 INJECTION INTRAVENOUS; SUBCUTANEOUS at 20:45

## 2023-01-08 RX ADMIN — LOSARTAN POTASSIUM 100 MILLIGRAM(S): 100 TABLET, FILM COATED ORAL at 05:58

## 2023-01-08 RX ADMIN — HEPARIN SODIUM 7 UNIT(S)/HR: 5000 INJECTION INTRAVENOUS; SUBCUTANEOUS at 07:26

## 2023-01-08 RX ADMIN — HEPARIN SODIUM 7 UNIT(S)/HR: 5000 INJECTION INTRAVENOUS; SUBCUTANEOUS at 03:40

## 2023-01-08 RX ADMIN — HEPARIN SODIUM 900 UNIT(S)/HR: 5000 INJECTION INTRAVENOUS; SUBCUTANEOUS at 16:36

## 2023-01-08 RX ADMIN — PANTOPRAZOLE SODIUM 40 MILLIGRAM(S): 20 TABLET, DELAYED RELEASE ORAL at 05:57

## 2023-01-08 RX ADMIN — HEPARIN SODIUM 900 UNIT(S)/HR: 5000 INJECTION INTRAVENOUS; SUBCUTANEOUS at 19:23

## 2023-01-08 RX ADMIN — HEPARIN SODIUM 900 UNIT(S)/HR: 5000 INJECTION INTRAVENOUS; SUBCUTANEOUS at 09:16

## 2023-01-08 NOTE — PROGRESS NOTE ADULT - ASSESSMENT
52M with SMA dissection; however, no evidence of bowel compromise or concerning abdominal exam at this time. Patient with improving abdominal pain.     Plan:  - c/w ASA 81/heparin gtt  - CLD  - NPO after midnight   - Patient may benefit from stenting of SMA dissection  - Serial abdominal exams    Discussed with vascular surgery fellow, Dr. Mccallum, on behalf of vascular surgery attending on call, Dr Power.    Vascular Surgery  [id number] 52M with SMA dissection; however, no evidence of bowel compromise or concerning abdominal exam at this time. Patient with improving abdominal pain.     Plan:  - c/w ASA 81/heparin gtt  - CLD  - Patient may benefit from stenting of SMA dissection  - Serial abdominal exams    Discussed with vascular surgery fellow, Dr. Mccallum, on behalf of vascular surgery attending on call, Dr Power.    Vascular Surgery  [id number]

## 2023-01-09 LAB
ANION GAP SERPL CALC-SCNC: 13 MMOL/L — SIGNIFICANT CHANGE UP (ref 5–17)
APTT BLD: 90.7 SEC — HIGH (ref 27.5–35.5)
BLD GP AB SCN SERPL QL: NEGATIVE — SIGNIFICANT CHANGE UP
BUN SERPL-MCNC: 10 MG/DL — SIGNIFICANT CHANGE UP (ref 7–23)
CALCIUM SERPL-MCNC: 10.4 MG/DL — SIGNIFICANT CHANGE UP (ref 8.4–10.5)
CHLORIDE SERPL-SCNC: 100 MMOL/L — SIGNIFICANT CHANGE UP (ref 96–108)
CO2 SERPL-SCNC: 26 MMOL/L — SIGNIFICANT CHANGE UP (ref 22–31)
CREAT SERPL-MCNC: 0.96 MG/DL — SIGNIFICANT CHANGE UP (ref 0.5–1.3)
CULTURE RESULTS: SIGNIFICANT CHANGE UP
EGFR: 96 ML/MIN/1.73M2 — SIGNIFICANT CHANGE UP
GLUCOSE SERPL-MCNC: 118 MG/DL — HIGH (ref 70–99)
HCT VFR BLD CALC: 45.7 % — SIGNIFICANT CHANGE UP (ref 39–50)
HGB BLD-MCNC: 15.2 G/DL — SIGNIFICANT CHANGE UP (ref 13–17)
INR BLD: 1.09 RATIO — SIGNIFICANT CHANGE UP (ref 0.88–1.16)
MAGNESIUM SERPL-MCNC: 2.3 MG/DL — SIGNIFICANT CHANGE UP (ref 1.6–2.6)
MCHC RBC-ENTMCNC: 30 PG — SIGNIFICANT CHANGE UP (ref 27–34)
MCHC RBC-ENTMCNC: 33.3 GM/DL — SIGNIFICANT CHANGE UP (ref 32–36)
MCV RBC AUTO: 90.1 FL — SIGNIFICANT CHANGE UP (ref 80–100)
NRBC # BLD: 0 /100 WBCS — SIGNIFICANT CHANGE UP (ref 0–0)
PHOSPHATE SERPL-MCNC: 3.3 MG/DL — SIGNIFICANT CHANGE UP (ref 2.5–4.5)
PLATELET # BLD AUTO: 312 K/UL — SIGNIFICANT CHANGE UP (ref 150–400)
POTASSIUM SERPL-MCNC: 3.9 MMOL/L — SIGNIFICANT CHANGE UP (ref 3.5–5.3)
POTASSIUM SERPL-SCNC: 3.9 MMOL/L — SIGNIFICANT CHANGE UP (ref 3.5–5.3)
PROTHROM AB SERPL-ACNC: 12.5 SEC — SIGNIFICANT CHANGE UP (ref 10.5–13.4)
RBC # BLD: 5.07 M/UL — SIGNIFICANT CHANGE UP (ref 4.2–5.8)
RBC # FLD: 11.7 % — SIGNIFICANT CHANGE UP (ref 10.3–14.5)
RH IG SCN BLD-IMP: POSITIVE — SIGNIFICANT CHANGE UP
SARS-COV-2 RNA SPEC QL NAA+PROBE: SIGNIFICANT CHANGE UP
SODIUM SERPL-SCNC: 139 MMOL/L — SIGNIFICANT CHANGE UP (ref 135–145)
SPECIMEN SOURCE: SIGNIFICANT CHANGE UP
WBC # BLD: 4.69 K/UL — SIGNIFICANT CHANGE UP (ref 3.8–10.5)
WBC # FLD AUTO: 4.69 K/UL — SIGNIFICANT CHANGE UP (ref 3.8–10.5)

## 2023-01-09 RX ORDER — POLYETHYLENE GLYCOL 3350 17 G/17G
17 POWDER, FOR SOLUTION ORAL DAILY
Refills: 0 | Status: DISCONTINUED | OUTPATIENT
Start: 2023-01-09 | End: 2023-01-10

## 2023-01-09 RX ORDER — APIXABAN 2.5 MG/1
5 TABLET, FILM COATED ORAL
Refills: 0 | Status: DISCONTINUED | OUTPATIENT
Start: 2023-01-09 | End: 2023-01-10

## 2023-01-09 RX ORDER — SENNA PLUS 8.6 MG/1
1 TABLET ORAL DAILY
Refills: 0 | Status: DISCONTINUED | OUTPATIENT
Start: 2023-01-09 | End: 2023-01-10

## 2023-01-09 RX ADMIN — Medication 81 MILLIGRAM(S): at 05:26

## 2023-01-09 RX ADMIN — POLYETHYLENE GLYCOL 3350 17 GRAM(S): 17 POWDER, FOR SOLUTION ORAL at 09:31

## 2023-01-09 RX ADMIN — APIXABAN 5 MILLIGRAM(S): 2.5 TABLET, FILM COATED ORAL at 18:25

## 2023-01-09 RX ADMIN — ATORVASTATIN CALCIUM 20 MILLIGRAM(S): 80 TABLET, FILM COATED ORAL at 22:15

## 2023-01-09 RX ADMIN — HEPARIN SODIUM 900 UNIT(S)/HR: 5000 INJECTION INTRAVENOUS; SUBCUTANEOUS at 07:35

## 2023-01-09 RX ADMIN — LOSARTAN POTASSIUM 100 MILLIGRAM(S): 100 TABLET, FILM COATED ORAL at 05:26

## 2023-01-09 RX ADMIN — PANTOPRAZOLE SODIUM 40 MILLIGRAM(S): 20 TABLET, DELAYED RELEASE ORAL at 05:26

## 2023-01-09 NOTE — PROGRESS NOTE ADULT - ASSESSMENT
52M with SMA dissection; however, no evidence of bowel compromise or concerning abdominal exam at this time. Patient with improving abdominal pain.     Plan:  - c/w ASA 81/heparin gtt  - CLD  - Patient may benefit from stenting of SMA dissection, IR consulted for stenting  - Serial abdominal exams      Vascular Surgery  [id number]

## 2023-01-09 NOTE — CONSULT NOTE ADULT - SUBJECTIVE AND OBJECTIVE BOX
Interventional Radiology  Evaluate for Procedure: SMA Stent    HPI: Patient is a 52 year old male with PMHx significant for HTN and HLD presenting with abdominal pain. He has been having intermittent abdominal pain since November 2021. Since 12/24/2022, the pain has been getting more intense and episodes have been more frequent. Abdominal pain is localized to LLQ, RLQ, and periumbilical areas. Usually post prandial. Denies nausea, vomiting, diarrhea, fevers, and chills. Having BMs and passing flatus. In ED, on CT with IV contrast and CTA, he is found to have a SMA dissection with adequate distal perfusion and no evidence of bowel compromise.    Allergies: NKDA  Medications (Abx/Cardiac/Anticoagulation/Blood Products)  aspirin  chewable: 81 milliGRAM(s) Oral (01-09 @ 05:26)  heparin  Infusion: 7 mL/Hr IV Continuous (01-08 @ 03:41)  heparin  Infusion.: 900 Unit(s)/Hr IV Continuous (01-08 @ 20:46)  hydrochlorothiazide: 12.5 milliGRAM(s) Oral (01-09 @ 05:27)  losartan: 100 milliGRAM(s) Oral (01-09 @ 05:26)    Data:  T(C): 36.9  HR: 80  BP: 114/78  RR: 18  SpO2: 98%    -WBC 4.69 / HgB 15.2 / Hct 45.7 / Plt 312  -Na 139 / Cl 100 / BUN 10 / Glucose 118  -K 3.9 / CO2 26 / Cr 0.96  -ALT -- / Alk Phos -- / T.Bili --  -INR 1.09 / PTT 90.7    Radiology: reviewed    Assessment/Plan: 52M with SMA dissection; however, no evidence of bowel compromise or concerning abdominal exam at this time. Patient with improving abdominal pain. IR consulted for SMA stenting.     - case and imaging reviewed with IR attending Dr. Land and discussed with Dr. Power  - the chronicity involvement of jejunal branches and the length and complexity of the lesion makes it a technically challenging with questionable benefit at this point  - recommend medical management  and if signs of bowel ischemia or exulceration of pain than the risk of complex endovascular will be justified.   - d/w primary team    Eboni Millard NP  Available on Teams

## 2023-01-09 NOTE — PROVIDER CONTACT NOTE (OTHER) - ASSESSMENT
Pt aPTT result came back at 36.0 while on 3ml/hr patient specific heparin drip. As per Provider's orders, aPTT target is 58-99. RN notified MD Hema about aPTT result as per orders.
Pt sitting in chair w/ hep drip @ 9mL/hr. Pt c/o sharp pain in chest which has since dissipated. No c/o SOB or n/t in extremities. VSS, no visual indicators of distress noted.
Pt bedtime VS noted to be HR 51, /75, satting on 97% RA. Pt is resting comfortably in bed at this time w/no complaints of chest pain/SOB/dizziness/pain.
Pt aptt result was noted to be 82.8 with heparin drip @ 9ml/hr as per providers orders. At this time, pt has had x2 therapeutic aPTT results. Pt to remain at 9ml/hr on the heparin drip as per full coagulation  nomogram orders.

## 2023-01-09 NOTE — PROVIDER CONTACT NOTE (OTHER) - ACTION/TREATMENT ORDERED:
As per MD Keyona, will continue to monitor. No further interventions ordered at this time.
As per MD Hema, as per aPTT result, Pt specific heparin drip will be increased to 6ml/hr. Draw aPTT 6 hours from 6ml/hr start time. No further interventions ordered at this time.
As per MD Anni Aguirre, EKG to be ordered. Provider to come to bedside to assess pt.
As per MD Keyona, keep heparin drip at 9ml/hr. Draw aPTT daily as pt has been therapeutic x2. No further interventions ordered at this time.

## 2023-01-10 ENCOUNTER — TRANSCRIPTION ENCOUNTER (OUTPATIENT)
Age: 53
End: 2023-01-10

## 2023-01-10 VITALS
RESPIRATION RATE: 18 BRPM | SYSTOLIC BLOOD PRESSURE: 116 MMHG | DIASTOLIC BLOOD PRESSURE: 79 MMHG | HEART RATE: 67 BPM | TEMPERATURE: 98 F | OXYGEN SATURATION: 96 %

## 2023-01-10 LAB
ANION GAP SERPL CALC-SCNC: 12 MMOL/L — SIGNIFICANT CHANGE UP (ref 5–17)
BLD GP AB SCN SERPL QL: NEGATIVE — SIGNIFICANT CHANGE UP
BUN SERPL-MCNC: 12 MG/DL — SIGNIFICANT CHANGE UP (ref 7–23)
CALCIUM SERPL-MCNC: 10 MG/DL — SIGNIFICANT CHANGE UP (ref 8.4–10.5)
CHLORIDE SERPL-SCNC: 102 MMOL/L — SIGNIFICANT CHANGE UP (ref 96–108)
CO2 SERPL-SCNC: 24 MMOL/L — SIGNIFICANT CHANGE UP (ref 22–31)
CREAT SERPL-MCNC: 0.85 MG/DL — SIGNIFICANT CHANGE UP (ref 0.5–1.3)
EGFR: 105 ML/MIN/1.73M2 — SIGNIFICANT CHANGE UP
GLUCOSE SERPL-MCNC: 127 MG/DL — HIGH (ref 70–99)
HCT VFR BLD CALC: 44.5 % — SIGNIFICANT CHANGE UP (ref 39–50)
HGB BLD-MCNC: 14.9 G/DL — SIGNIFICANT CHANGE UP (ref 13–17)
INR BLD: 1.2 RATIO — HIGH (ref 0.88–1.16)
MAGNESIUM SERPL-MCNC: 2.3 MG/DL — SIGNIFICANT CHANGE UP (ref 1.6–2.6)
MCHC RBC-ENTMCNC: 30.2 PG — SIGNIFICANT CHANGE UP (ref 27–34)
MCHC RBC-ENTMCNC: 33.5 GM/DL — SIGNIFICANT CHANGE UP (ref 32–36)
MCV RBC AUTO: 90.1 FL — SIGNIFICANT CHANGE UP (ref 80–100)
NRBC # BLD: 0 /100 WBCS — SIGNIFICANT CHANGE UP (ref 0–0)
PHOSPHATE SERPL-MCNC: 3.3 MG/DL — SIGNIFICANT CHANGE UP (ref 2.5–4.5)
PLATELET # BLD AUTO: 309 K/UL — SIGNIFICANT CHANGE UP (ref 150–400)
POTASSIUM SERPL-MCNC: 4.2 MMOL/L — SIGNIFICANT CHANGE UP (ref 3.5–5.3)
POTASSIUM SERPL-SCNC: 4.2 MMOL/L — SIGNIFICANT CHANGE UP (ref 3.5–5.3)
PROTHROM AB SERPL-ACNC: 14 SEC — HIGH (ref 10.5–13.4)
RBC # BLD: 4.94 M/UL — SIGNIFICANT CHANGE UP (ref 4.2–5.8)
RBC # FLD: 11.7 % — SIGNIFICANT CHANGE UP (ref 10.3–14.5)
RH IG SCN BLD-IMP: POSITIVE — SIGNIFICANT CHANGE UP
SODIUM SERPL-SCNC: 138 MMOL/L — SIGNIFICANT CHANGE UP (ref 135–145)
WBC # BLD: 4.49 K/UL — SIGNIFICANT CHANGE UP (ref 3.8–10.5)
WBC # FLD AUTO: 4.49 K/UL — SIGNIFICANT CHANGE UP (ref 3.8–10.5)

## 2023-01-10 PROCEDURE — 86850 RBC ANTIBODY SCREEN: CPT

## 2023-01-10 PROCEDURE — 80053 COMPREHEN METABOLIC PANEL: CPT

## 2023-01-10 PROCEDURE — 82803 BLOOD GASES ANY COMBINATION: CPT

## 2023-01-10 PROCEDURE — 81003 URINALYSIS AUTO W/O SCOPE: CPT

## 2023-01-10 PROCEDURE — 82330 ASSAY OF CALCIUM: CPT

## 2023-01-10 PROCEDURE — 93005 ELECTROCARDIOGRAM TRACING: CPT

## 2023-01-10 PROCEDURE — U0005: CPT

## 2023-01-10 PROCEDURE — 74174 CTA ABD&PLVS W/CONTRAST: CPT | Mod: MA

## 2023-01-10 PROCEDURE — 84295 ASSAY OF SERUM SODIUM: CPT

## 2023-01-10 PROCEDURE — 85018 HEMOGLOBIN: CPT

## 2023-01-10 PROCEDURE — 85014 HEMATOCRIT: CPT

## 2023-01-10 PROCEDURE — 84132 ASSAY OF SERUM POTASSIUM: CPT

## 2023-01-10 PROCEDURE — 85610 PROTHROMBIN TIME: CPT

## 2023-01-10 PROCEDURE — 96375 TX/PRO/DX INJ NEW DRUG ADDON: CPT

## 2023-01-10 PROCEDURE — 83690 ASSAY OF LIPASE: CPT

## 2023-01-10 PROCEDURE — 99285 EMERGENCY DEPT VISIT HI MDM: CPT

## 2023-01-10 PROCEDURE — 74177 CT ABD & PELVIS W/CONTRAST: CPT | Mod: MA

## 2023-01-10 PROCEDURE — 83605 ASSAY OF LACTIC ACID: CPT

## 2023-01-10 PROCEDURE — 82435 ASSAY OF BLOOD CHLORIDE: CPT

## 2023-01-10 PROCEDURE — 87086 URINE CULTURE/COLONY COUNT: CPT

## 2023-01-10 PROCEDURE — 85027 COMPLETE CBC AUTOMATED: CPT

## 2023-01-10 PROCEDURE — 96374 THER/PROPH/DIAG INJ IV PUSH: CPT

## 2023-01-10 PROCEDURE — 86900 BLOOD TYPING SEROLOGIC ABO: CPT

## 2023-01-10 PROCEDURE — 85730 THROMBOPLASTIN TIME PARTIAL: CPT

## 2023-01-10 PROCEDURE — 87637 SARSCOV2&INF A&B&RSV AMP PRB: CPT

## 2023-01-10 PROCEDURE — 83735 ASSAY OF MAGNESIUM: CPT

## 2023-01-10 PROCEDURE — 82947 ASSAY GLUCOSE BLOOD QUANT: CPT

## 2023-01-10 PROCEDURE — U0003: CPT

## 2023-01-10 PROCEDURE — 80048 BASIC METABOLIC PNL TOTAL CA: CPT

## 2023-01-10 PROCEDURE — 36415 COLL VENOUS BLD VENIPUNCTURE: CPT

## 2023-01-10 PROCEDURE — 85025 COMPLETE CBC W/AUTO DIFF WBC: CPT

## 2023-01-10 PROCEDURE — 99233 SBSQ HOSP IP/OBS HIGH 50: CPT

## 2023-01-10 PROCEDURE — 84100 ASSAY OF PHOSPHORUS: CPT

## 2023-01-10 PROCEDURE — 86901 BLOOD TYPING SEROLOGIC RH(D): CPT

## 2023-01-10 RX ORDER — ASPIRIN/CALCIUM CARB/MAGNESIUM 324 MG
1 TABLET ORAL
Qty: 0 | Refills: 0 | DISCHARGE
Start: 2023-01-10

## 2023-01-10 RX ORDER — APIXABAN 2.5 MG/1
1 TABLET, FILM COATED ORAL
Qty: 60 | Refills: 0
Start: 2023-01-10 | End: 2023-02-08

## 2023-01-10 RX ADMIN — APIXABAN 5 MILLIGRAM(S): 2.5 TABLET, FILM COATED ORAL at 06:15

## 2023-01-10 RX ADMIN — Medication 81 MILLIGRAM(S): at 06:14

## 2023-01-10 RX ADMIN — LOSARTAN POTASSIUM 100 MILLIGRAM(S): 100 TABLET, FILM COATED ORAL at 06:15

## 2023-01-10 RX ADMIN — PANTOPRAZOLE SODIUM 40 MILLIGRAM(S): 20 TABLET, DELAYED RELEASE ORAL at 06:14

## 2023-01-10 NOTE — DISCHARGE NOTE NURSING/CASE MANAGEMENT/SOCIAL WORK - NSDCPEWEB_GEN_ALL_CORE
[facility] for Tobacco Control website --- [email or web address] website --- [email or web address]

## 2023-01-10 NOTE — DISCHARGE NOTE NURSING/CASE MANAGEMENT/SOCIAL WORK - NSDCPEEMAIL_GEN_ALL_CORE
[facility] for Tobacco Control email [email or web address]

## 2023-01-10 NOTE — DISCHARGE NOTE PROVIDER - CARE PROVIDER_API CALL
Trev Power)  Vascular Surgery  [address], [address]  [place] [address]  Phone: [phone]  Fax: [phone]  Follow Up Time: 2 weeks

## 2023-01-10 NOTE — PROGRESS NOTE ADULT - SUBJECTIVE AND OBJECTIVE BOX
VASCULAR SURGERY PROGRESS NOTE    Subjective:   No acute events overnight. Pt seen and examined on morning rounds. Complains of residual RUQ/RLQ pain but much improved. Patient states he would like to pursue SMA stent.    O:  Vital Signs Last 24 Hrs  T(C): 36.6 (08 Jan 2023 05:10), Max: 36.7 (07 Jan 2023 13:40)  T(F): 97.8 (08 Jan 2023 05:10), Max: 98.1 (07 Jan 2023 13:40)  HR: 66 (08 Jan 2023 05:10) (56 - 73)  BP: 121/85 (08 Jan 2023 05:10) (118/77 - 131/90)  RR: 16 (08 Jan 2023 05:10) (16 - 18)  SpO2: 98% (08 Jan 2023 05:10) (97% - 98%)  Parameters below as of 08 Jan 2023 05:10  Patient On (Oxygen Delivery Method): room air    I&O's Detail  07 Jan 2023 07:01  -  08 Jan 2023 07:00  IN:    Oral Fluid: 1160 mL  Total IN: 1160 mL  OUT:  Total OUT: 0 mL  Total NET: 1160 mL    PHYSICAL EXAM:  GENERAL: NAD, well-groomed, well-developed  HEENT: NC/AT  CHEST/LUNG: Breathing even, unlabored  HEART: Regular rate and rhythm  ABDOMEN: Soft, nondistended. Incision C/D/I  EXTREMITIES: good distal pulses b/l   NEURO:  No focal deficits    LABS:                      14.6   5.99  )-----------( 291      ( 07 Jan 2023 07:05 )             43.4     136  |  99  |  10  ----------------------------<  109<H>  3.8   |  23  |  0.81    Ca    9.8      07 Jan 2023 07:03  Phos  3.5     01-07  Mg     2.1     01-07    TPro  7.7  /  Alb  4.7  /  TBili  0.9  /  DBili  x   /  AST  22  /  ALT  47<H>  /  AlkPhos  55  01-06    PTT - ( 08 Jan 2023 03:10 )  PTT:57.6 sec    IMAGING:  ACC: [id number]  EXAM: CT ANGIO ABD PELV (W)AW IC  PROCEDURE DATE: 01/06/2023  INTERPRETATION: CLINICAL INFORMATION: Dissection of superior mesenteric artery at same day prior exam.  COMPARISON: CT abdomen/pelvis 1/6/2023, 4:41 PM.    CONTRAST/COMPLICATIONS:  IV Contrast: Omnipaque 350 90 cc administered 10 cc discarded  Oral Contrast: NONE  Complications: None reported at time of study completion    PROCEDURE:  CT Angiography of the Abdomen and Pelvis was performed.  Arterial and venous phases were acquired.  Sagittal and coronal reformats were performed as well as 3D (MIP) reconstructions.    FINDINGS:  LOWER CHEST: Bibasilar subsegmental dependent atelectasis.    LIVER: Bilobar subcentimeter hypodensities, too small to characterize.  BILE DUCTS: Normal caliber.  GALLBLADDER: Within normal limits.  SPLEEN: Within normal limits.  PANCREAS: Within normal limits.  ADRENALS: Within normal limits.  KIDNEYS/URETERS: Within normal limits.    BLADDER: Contrast opacified.  REPRODUCTIVE ORGANS: Prostate is mildly enlarged.    BOWEL: No bowel obstruction. Bowel wall enhancement is preserved. Bowel wall enhancement is preserved. No pneumatosis. Appendix is normal.  PERITONEUM: No ascites.  VESSELS: Mild atherosclerotic changes. Trace perivascular fat stranding and a short segment dissection flap extending from SMA origin to the level of takeoff of the second jejunal branch (601/64-66) with preserved distal flow, without significant change as compared to prior same day exam.  RETROPERITONEUM/LYMPH NODES: No lymphadenopathy.  ABDOMINAL WALL: Within normal limits.  BONES: Degenerative changes. Straightened lumbar lordosis.    IMPRESSION: Short segment SMA dissection flap with perivascular fat stranding, possibly due to vasculitis. No evidence of mesenteric ischemia.
VASCULAR SURGERY DAILY PROGRESS NOTE:     SUBJECTIVE/ROS: Patient seen and examined. He feels well. Tolerating diet without abdominal pain, nausea, vomiting. He is passing gas and having bowel movements.    MEDICATIONS  (STANDING):  apixaban 5 milliGRAM(s) Oral two times a day  aspirin  chewable 81 milliGRAM(s) Oral every 24 hours  atorvastatin 20 milliGRAM(s) Oral at bedtime  hydrochlorothiazide 12.5 milliGRAM(s) Oral daily  losartan 100 milliGRAM(s) Oral daily  pantoprazole    Tablet 40 milliGRAM(s) Oral before breakfast    MEDICATIONS  (PRN):  polyethylene glycol 3350 17 Gram(s) Oral daily PRN Constipation  senna 1 Tablet(s) Oral daily PRN Constipation      OBJECTIVE:    Vital Signs Last 24 Hrs  T(C): 36.6 (10 Berry 2023 04:53), Max: 36.9 (09 Jan 2023 08:48)  T(F): 97.8 (10 Berry 2023 04:53), Max: 98.5 (09 Jan 2023 08:48)  HR: 60 (10 Berry 2023 04:53) (55 - 80)  BP: 110/71 (10 Berry 2023 04:53) (109/73 - 132/92)  BP(mean): --  RR: 18 (10 Berry 2023 04:53) (18 - 18)  SpO2: 97% (10 Berry 2023 04:53) (95% - 98%)    Parameters below as of 10 Berry 2023 04:53  Patient On (Oxygen Delivery Method): room air            I&O's Detail    09 Jan 2023 07:01  -  10 Berry 2023 07:00  --------------------------------------------------------  IN:    Oral Fluid: 940 mL  Total IN: 940 mL    OUT:  Total OUT: 0 mL    Total NET: 940 mL          Daily     Daily     LABS:                        14.9   4.49  )-----------( 309      ( 10 Berry 2023 07:04 )             44.5     01-09    139  |  100  |  10  ----------------------------<  118<H>  3.9   |  26  |  0.96    Ca    10.4      09 Jan 2023 06:57  Phos  3.3     01-09  Mg     2.3     01-09      PT/INR - ( 10 Berry 2023 07:04 )   PT: 14.0 sec;   INR: 1.20 ratio         PTT - ( 09 Jan 2023 06:59 )  PTT:90.7 sec              PHYSICAL EXAM:    GENERAL: NAD, well-groomed, well-developed  HEENT: NC/AT  CHEST/LUNG: Breathing even, unlabored  HEART: Regular rate and rhythm  ABDOMEN: Soft, nondistended. Incision C/D/I  EXTREMITIES: good distal pulses b/l   NEURO:  No focal deficits              
VASCULAR SURGERY DAILY PROGRESS NOTE:     SUBJECTIVE/ROS: Patient seen and examined. Abdominal pain is intermittent. patient with no new complaints.      MEDICATIONS  (STANDING):  aspirin  chewable 81 milliGRAM(s) Oral every 24 hours  atorvastatin 20 milliGRAM(s) Oral at bedtime  heparin  Infusion. 900 Unit(s)/Hr (9 mL/Hr) IV Continuous <Continuous>  hydrochlorothiazide 12.5 milliGRAM(s) Oral daily  losartan 100 milliGRAM(s) Oral daily  pantoprazole    Tablet 40 milliGRAM(s) Oral before breakfast    MEDICATIONS  (PRN):  polyethylene glycol 3350 17 Gram(s) Oral daily PRN Constipation  senna 1 Tablet(s) Oral daily PRN Constipation      OBJECTIVE:    Vital Signs Last 24 Hrs  T(C): 36.4 (2023 04:40), Max: 36.8 (2023 08:45)  T(F): 97.5 (2023 04:40), Max: 98.3 (2023 08:45)  HR: 63 (2023 04:40) (51 - 73)  BP: 129/85 (2023 04:40) (109/70 - 129/88)  BP(mean): --  RR: 16 (2023 04:40) (16 - 18)  SpO2: 97% (2023 04:40) (96% - 98%)    Parameters below as of 2023 04:40  Patient On (Oxygen Delivery Method): room air            I&O's Detail    2023 07:01  -  2023 07:00  --------------------------------------------------------  IN:    Heparin Infusion: 108 mL    Oral Fluid: 980 mL  Total IN: 1088 mL    OUT:  Total OUT: 0 mL    Total NET: 1088 mL          Daily     Daily     LABS:                        15.2   4.69  )-----------( 312      ( 2023 06:58 )             45.7     01-09    139  |  100  |  10  ----------------------------<  118<H>  3.9   |  26  |  0.96    Ca    10.4      2023 06:57  Phos  3.3     -  Mg     2.3     -09      PT/INR - ( 2023 06:59 )   PT: 12.5 sec;   INR: 1.09 ratio         PTT - ( 2023 06:59 )  PTT:90.7 sec  Urinalysis Basic - ( 2023 22:01 )    Color: Light Yellow / Appearance: Clear / S.019 / pH: x  Gluc: x / Ketone: Trace  / Bili: Negative / Urobili: Negative   Blood: x / Protein: Negative / Nitrite: Negative   Leuk Esterase: Negative / RBC: x / WBC x   Sq Epi: x / Non Sq Epi: x / Bacteria: x        PHYSICAL EXAM:  GENERAL: NAD, well-groomed, well-developed  HEENT: NC/AT  CHEST/LUNG: Breathing even, unlabored  HEART: Regular rate and rhythm  ABDOMEN: Soft, nondistended. Incision C/D/I  EXTREMITIES: good distal pulses b/l   NEURO:  No focal deficits

## 2023-01-10 NOTE — DISCHARGE NOTE NURSING/CASE MANAGEMENT/SOCIAL WORK - PATIENT PORTAL LINK FT
You can access the FollowMyHealth Patient Portal offered by [facility] by registering at the following website: [email or web address]. By joining [email or web address] FollowMyHealth portal, you will also be able to view your health information using other applications (apps) compatible with our system.

## 2023-01-10 NOTE — DISCHARGE NOTE PROVIDER - HOSPITAL COURSE
Patient is a 52 year old male with PMHx significant for HTN and HLD presenting with abdominal pain. He has been having intermittent abdominal pain since November 2021. Since 12/24/2022, the pain has been getting more intense and episodes have been more frequent. Abdominal pain is localized to LLQ, RLQ, and periumbilical areas. Usually post prandial. Denies nausea, vomiting, diarrhea, fevers, and chills. Having BMs and passing flatus.    In ED, on CT with IV contrast and CTA, he is found to have a SMA dissection with adequate distal perfusion and no evidence of bowel compromise. Patient admitted to vascular surgery team. Patient started on clear liquid diet and IVF. Interventional radiology was consulted - due to the chronicity involvement of jejunal branches and the length and complexity of the lesion makes it a technically challenging with questionable benefit. They recommend medical management and if signs of bowel ischemia or exulceration of pain than the risk of complex endovascular will be justified.  Diet was advanced as tolerated. patient discharged on oral anticoagulation. Outpatient follow up with Dr. Power in 1-2 weeks.

## 2023-01-10 NOTE — DISCHARGE NOTE PROVIDER - NSDCCPCAREPLAN_GEN_ALL_CORE_FT
PRINCIPAL DISCHARGE DIAGNOSIS  Diagnosis: Dissection of mesenteric artery  Assessment and Plan of Treatment: conservative management  ACTIVITY: No restrictions   DIET: regular diet   NOTIFY YOUR SURGEON IF: You have any fever (over 100.4 F) or chills, persistent nausea/vomiting with inability to tolerate food or liquids, persistent diarrhea, or if your pain is not controlled.   FOLLOW-UP:  1. Please call to make a follow-up appointment within two weeks of discharge   2. Please follow up with your primary care physician in one week regarding your hospitalization.

## 2023-01-10 NOTE — PROGRESS NOTE ADULT - ASSESSMENT
52M with SMA dissection; however, no evidence of bowel compromise or concerning abdominal exam at this time. Patient with improving abdominal pain.     Plan:  - c/w ASA 81  - Eliquis 5 BID   - regular diet  - discharge planning    Vascular Surgery  [id number]

## 2023-03-06 ENCOUNTER — APPOINTMENT (OUTPATIENT)
Dept: VASCULAR SURGERY | Facility: CLINIC | Age: 53
End: 2023-03-06
Payer: COMMERCIAL

## 2023-03-06 VITALS
DIASTOLIC BLOOD PRESSURE: 92 MMHG | HEART RATE: 69 BPM | BODY MASS INDEX: 24.59 KG/M2 | WEIGHT: 153 LBS | HEIGHT: 66 IN | SYSTOLIC BLOOD PRESSURE: 137 MMHG

## 2023-03-06 LAB
ALBUMIN SERPL ELPH-MCNC: 4.7 G/DL
ALP BLD-CCNC: 59 U/L
ALT SERPL-CCNC: 25 U/L
ANION GAP SERPL CALC-SCNC: 12 MMOL/L
AST SERPL-CCNC: 18 U/L
BILIRUB SERPL-MCNC: 0.8 MG/DL
BUN SERPL-MCNC: 9 MG/DL
CALCIUM SERPL-MCNC: 10.1 MG/DL
CHLORIDE SERPL-SCNC: 101 MMOL/L
CO2 SERPL-SCNC: 26 MMOL/L
CREAT SERPL-MCNC: 0.86 MG/DL
EGFR: 105 ML/MIN/1.73M2
GLUCOSE SERPL-MCNC: 120 MG/DL
LPL SERPL-CCNC: 18 U/L
POTASSIUM SERPL-SCNC: 4.2 MMOL/L
PROT SERPL-MCNC: 7.2 G/DL
SODIUM SERPL-SCNC: 138 MMOL/L

## 2023-03-06 PROCEDURE — 99214 OFFICE O/P EST MOD 30 MIN: CPT

## 2023-03-06 PROCEDURE — 93975 VASCULAR STUDY: CPT

## 2023-03-09 NOTE — ASSESSMENT
[FreeTextEntry1] : Patient with acute mesenteric artery dissection with excellent flow distal to the dissection flap with no evidence of bowel ischemia.\par \par I do not believe the pain described is related to bowel ischemia.\par \par Symptoms may be related to the acute dissection which is slowly resolving.  Patient should continue with Eliquis.\par \par Patient needs better control of his blood pressure and therefore recommend follow-up with cardiology team.\par \par Patient should strongly stay away from alcohol and cigarettes.  This was discussed with the patient in detail.  Follow-up in 6 months with repeat mesenteric artery duplex.

## 2023-03-09 NOTE — HISTORY OF PRESENT ILLNESS
[FreeTextEntry1] : Patient is a 52-year-old gentleman with past medical history significant for uncontrolled hypertension, smoking, alcohol abuse who presented to the emergency room about a month ago with severe abdominal pain.  Patient underwent a work-up which showed acute dissection of the superior mesenteric artery.  Patient was treated conservatively with anticoagulation and has done well.  The acute pain has resolved but the patient continues to have discomfort and a dull pain across upper abdomen.\par \par No complaint of significant nausea vomiting diarrhea constipation.  Patient tolerates food well.\par \par Patient has stopped smoking but continues to have alcohol on a regular basis.\par \par Patient continues to have significant hypertension

## 2023-04-17 ENCOUNTER — APPOINTMENT (OUTPATIENT)
Dept: VASCULAR SURGERY | Facility: CLINIC | Age: 53
End: 2023-04-17

## 2023-09-11 ENCOUNTER — APPOINTMENT (OUTPATIENT)
Dept: VASCULAR SURGERY | Facility: CLINIC | Age: 53
End: 2023-09-11
Payer: COMMERCIAL

## 2023-09-11 DIAGNOSIS — I77.79 DISSECTION OF OTHER SPECIFIED ARTERY: ICD-10-CM

## 2023-09-11 LAB
ALBUMIN SERPL ELPH-MCNC: 5 G/DL
ALP BLD-CCNC: 52 U/L
ALT SERPL-CCNC: 26 U/L
AMYLASE/CREAT SERPL: 59 U/L
ANION GAP SERPL CALC-SCNC: 15 MMOL/L
AST SERPL-CCNC: 20 U/L
BILIRUB SERPL-MCNC: 1 MG/DL
BUN SERPL-MCNC: 11 MG/DL
CALCIUM SERPL-MCNC: 10.2 MG/DL
CHLORIDE SERPL-SCNC: 111 MMOL/L
CO2 SERPL-SCNC: 26 MMOL/L
CREAT SERPL-MCNC: 0.85 MG/DL
EGFR: 105 ML/MIN/1.73M2
GLUCOSE SERPL-MCNC: 122 MG/DL
LPL SERPL-CCNC: 22 U/L
POTASSIUM SERPL-SCNC: 5.1 MMOL/L
PROT SERPL-MCNC: 7.8 G/DL
SODIUM SERPL-SCNC: 151 MMOL/L

## 2023-09-11 PROCEDURE — 93975 VASCULAR STUDY: CPT

## 2023-09-11 PROCEDURE — 99214 OFFICE O/P EST MOD 30 MIN: CPT

## 2023-10-16 ENCOUNTER — APPOINTMENT (OUTPATIENT)
Dept: VASCULAR SURGERY | Facility: CLINIC | Age: 53
End: 2023-10-16

## 2023-10-25 ENCOUNTER — NON-APPOINTMENT (OUTPATIENT)
Age: 53
End: 2023-10-25

## 2024-08-23 NOTE — ED ADULT TRIAGE NOTE - NS ED TRIAGE AVPU SCALE
Alert-The patient is alert, awake and responds to voice. The patient is oriented to time, place, and person. The triage nurse is able to obtain subjective information. Interval events:   urine clear with CBI clamped         OBJECTIVE:  Vital Signs Last 24 Hrs  T(C): 36.3 (23 Aug 2024 05:15), Max: 36.5 (22 Aug 2024 21:00)  T(F): 97.3 (23 Aug 2024 05:15), Max: 97.7 (22 Aug 2024 21:00)  HR: 45 (23 Aug 2024 05:15) (45 - 59)  BP: 106/39 (23 Aug 2024 05:15) (106/39 - 156/44)  BP(mean): --  RR: 18 (23 Aug 2024 05:15) (18 - 18)  SpO2: 98% (23 Aug 2024 05:15) (98% - 100%)    Parameters below as of 23 Aug 2024 05:15  Patient On (Oxygen Delivery Method): nasal cannula  O2 Flow (L/min): 2      Physical Examination:  GEN: NAD, resting quietly  : urine clear, cbi clamped       LABS:                        7.4    6.16  )-----------( 145      ( 22 Aug 2024 05:21 )             23.5       08-22    139  |  102  |  30<H>  ----------------------------<  93  4.4   |  26  |  0.88    Ca    8.3<L>      22 Aug 2024 05:21  Phos  3.4     08-22  Mg     1.90     08-22

## 2024-11-22 NOTE — ED PROVIDER NOTE - IV ALTEPLASE EXCL REL HIDDEN
Head, normocephalic, atraumatic, Face, Face within normal limits, Ears, External ears within normal limits, Nose/Nasopharynx, External nose normal appearance, nares patent, no nasal discharge, Mouth and Throat, Oral cavity appearance normal, Lips, Appearance normal show

## 2024-11-22 NOTE — ED PROVIDER NOTE - CLINICAL SUMMARY MEDICAL DECISION MAKING FREE TEXT BOX
Attending note.  Acute on chronic intermittent abdominal pain associated with nausea.  No appreciable weight loss, GI bleeding, fever.  CT scan abdomen pelvis, lipase, labs, urinalysis and reassess.
Statement Selected

## 2025-02-15 NOTE — ED PROVIDER NOTE - NSICDXPASTMEDICALHX_GEN_ALL_CORE_FT
PAST MEDICAL HISTORY:  COVID-19 vaccine series completed W booster    GERD (gastroesophageal reflux disease)     HTN (hypertension)     Migraines     Tinnitus      No indicators present